# Patient Record
Sex: FEMALE | Race: WHITE | Employment: UNEMPLOYED | URBAN - METROPOLITAN AREA
[De-identification: names, ages, dates, MRNs, and addresses within clinical notes are randomized per-mention and may not be internally consistent; named-entity substitution may affect disease eponyms.]

---

## 2017-01-24 ENCOUNTER — HOSPITAL ENCOUNTER (OUTPATIENT)
Dept: GENERAL RADIOLOGY | Age: 42
Discharge: HOME | End: 2017-01-24
Attending: PHYSICIAN ASSISTANT
Payer: COMMERCIAL

## 2017-01-24 ENCOUNTER — OFFICE VISIT (OUTPATIENT)
Dept: FAMILY MEDICINE CLINIC | Facility: CLINIC | Age: 42
End: 2017-01-24

## 2017-01-24 ENCOUNTER — TELEPHONE (OUTPATIENT)
Dept: FAMILY MEDICINE CLINIC | Facility: CLINIC | Age: 42
End: 2017-01-24

## 2017-01-24 VITALS
TEMPERATURE: 98 F | BODY MASS INDEX: 46 KG/M2 | SYSTOLIC BLOOD PRESSURE: 110 MMHG | DIASTOLIC BLOOD PRESSURE: 60 MMHG | OXYGEN SATURATION: 98 % | HEART RATE: 107 BPM | RESPIRATION RATE: 18 BRPM | WEIGHT: 293 LBS

## 2017-01-24 DIAGNOSIS — J40 BRONCHITIS: Primary | ICD-10-CM

## 2017-01-24 DIAGNOSIS — J02.9 SORE THROAT: ICD-10-CM

## 2017-01-24 DIAGNOSIS — R05.9 COUGH: ICD-10-CM

## 2017-01-24 LAB — CONTROL LINE PRESENT WITH A CLEAR BACKGROUND (YES/NO): YES YES/NO

## 2017-01-24 PROCEDURE — 87880 STREP A ASSAY W/OPTIC: CPT | Performed by: PHYSICIAN ASSISTANT

## 2017-01-24 PROCEDURE — 99213 OFFICE O/P EST LOW 20 MIN: CPT | Performed by: PHYSICIAN ASSISTANT

## 2017-01-24 PROCEDURE — 94640 AIRWAY INHALATION TREATMENT: CPT | Performed by: PHYSICIAN ASSISTANT

## 2017-01-24 PROCEDURE — 71020 XR CHEST PA + LAT CHEST (CPT=71020): CPT

## 2017-01-24 RX ORDER — IPRATROPIUM BROMIDE AND ALBUTEROL SULFATE 2.5; .5 MG/3ML; MG/3ML
3 SOLUTION RESPIRATORY (INHALATION) ONCE
Status: COMPLETED | OUTPATIENT
Start: 2017-01-24 | End: 2017-01-24

## 2017-01-24 RX ORDER — HYDROCODONE POLISTIREX AND CHLORPHENIRAMINE POLISTIREX 10; 8 MG/5ML; MG/5ML
5 SUSPENSION, EXTENDED RELEASE ORAL 2 TIMES DAILY PRN
Qty: 115 ML | Refills: 0 | Status: SHIPPED | OUTPATIENT
Start: 2017-01-24 | End: 2017-02-03

## 2017-01-24 RX ORDER — PREDNISONE 20 MG/1
TABLET ORAL
Qty: 12 TABLET | Refills: 0 | Status: SHIPPED | OUTPATIENT
Start: 2017-01-24 | End: 2017-03-09 | Stop reason: ALTCHOICE

## 2017-01-24 RX ADMIN — IPRATROPIUM BROMIDE AND ALBUTEROL SULFATE 3 ML: 2.5; .5 SOLUTION RESPIRATORY (INHALATION) at 10:27:00

## 2017-01-24 NOTE — PROGRESS NOTES
CHIEF COMPLAINT:   Patient presents with:  Sore Throat: body aches, fever, sore throat, x 3days         HPI:   Debra Johnson is a 37year old female who presents for congestion for 3 days. Started as a sore throat.  She then began to have nasal congest Conjunctiva clear. No scleral icterus. HENT: Atraumatic, normocephalic. TM's pearly gray, no bulging, no fluid b/l. Nostrils patent, nasal mucosa pink and non-inflamed. No erythema of the throat. PND noted. No tonsillar enlargement or exudates   NECK: s

## 2017-02-16 RX ORDER — OMEPRAZOLE 40 MG/1
CAPSULE, DELAYED RELEASE ORAL
Qty: 90 CAPSULE | Refills: 0 | Status: SHIPPED | OUTPATIENT
Start: 2017-02-16 | End: 2017-05-05

## 2017-03-09 ENCOUNTER — OFFICE VISIT (OUTPATIENT)
Dept: FAMILY MEDICINE CLINIC | Facility: CLINIC | Age: 42
End: 2017-03-09

## 2017-03-09 VITALS
HEART RATE: 80 BPM | DIASTOLIC BLOOD PRESSURE: 60 MMHG | TEMPERATURE: 99 F | RESPIRATION RATE: 18 BRPM | WEIGHT: 293 LBS | BODY MASS INDEX: 46 KG/M2 | SYSTOLIC BLOOD PRESSURE: 100 MMHG | OXYGEN SATURATION: 99 %

## 2017-03-09 DIAGNOSIS — J40 BRONCHITIS: Primary | ICD-10-CM

## 2017-03-09 DIAGNOSIS — J04.0 LARYNGITIS: ICD-10-CM

## 2017-03-09 PROCEDURE — 99213 OFFICE O/P EST LOW 20 MIN: CPT | Performed by: NURSE PRACTITIONER

## 2017-03-09 RX ORDER — PREDNISONE 20 MG/1
20 TABLET ORAL 2 TIMES DAILY
Qty: 10 TABLET | Refills: 0 | Status: SHIPPED | OUTPATIENT
Start: 2017-03-09 | End: 2017-03-14

## 2017-03-10 NOTE — PATIENT INSTRUCTIONS
Humidifier in room  Sleep propped  Push fluids  Limit dairy  Mucinex as directed  Start steroids for worsening symptoms (wheezing, chest tightness, coughing fits)      Bronchitis, Viral (Adult)    You have a viral bronchitis.  Bronchitis is inflammation a · Your appetite may be poor, so a light diet is fine. Avoid dehydration by drinking 6 to 8 glasses of fluids per day (such as water, soft drinks, sports drinks, juices, tea, or soup). Extra fluids will help loosen secretions in the nose and lungs.   · Over- Laryngitis is a swelling of the tissues around the vocal cords. Symptoms include a hoarse (scratchy) voice. The voice may be lost completely. It may be caused by a viral illness, such as a head or chest cold.  It may also be due to overuse and strain of the

## 2017-03-10 NOTE — PROGRESS NOTES
CHIEF COMPLAINT:   Patient presents with:  Sore Throat: sore throat, loss of voice, cough, 3days      HPI:   David Nuñez is a 37year old female who presents for upper respiratory symptoms for  3 days.  Patient reports sore throat, congestion, dry co GI: denies N/V/C or abdominal pain  NEURO: Denies headaches    EXAM:   /60 mmHg  Pulse 80  Temp(Src) 99.2 °F (37.3 °C) (Oral)  Resp 18  Wt 303 lb  SpO2 99%  LMP 02/09/2017  GENERAL: well developed, well nourished,in no apparent distress  SKIN: no stefanie Start steroids for worsening symptoms (wheezing, chest tightness, coughing fits)      Bronchitis, Viral (Adult)    You have a viral bronchitis. Bronchitis is inflammation and swelling of the lining of the lungs. This is often caused by an infection.  Sympto · Your appetite may be poor, so a light diet is fine. Avoid dehydration by drinking 6 to 8 glasses of fluids per day (such as water, soft drinks, sports drinks, juices, tea, or soup). Extra fluids will help loosen secretions in the nose and lungs.   · Over- Laryngitis is a swelling of the tissues around the vocal cords. Symptoms include a hoarse (scratchy) voice. The voice may be lost completely. It may be caused by a viral illness, such as a head or chest cold.  It may also be due to overuse and strain of the

## 2017-03-12 ENCOUNTER — OFFICE VISIT (OUTPATIENT)
Dept: FAMILY MEDICINE CLINIC | Facility: CLINIC | Age: 42
End: 2017-03-12

## 2017-03-12 VITALS
WEIGHT: 293 LBS | OXYGEN SATURATION: 98 % | HEART RATE: 82 BPM | BODY MASS INDEX: 46 KG/M2 | DIASTOLIC BLOOD PRESSURE: 68 MMHG | SYSTOLIC BLOOD PRESSURE: 110 MMHG | TEMPERATURE: 98 F | RESPIRATION RATE: 16 BRPM

## 2017-03-12 DIAGNOSIS — Z02.9 ENCOUNTER FOR ADMINISTRATIVE EXAMINATIONS: Primary | ICD-10-CM

## 2017-03-12 NOTE — PROGRESS NOTES
Patient presents to walk in clinic with complaint of laryngitis and bronchitis. She was seen two days ago for URI and diagnosed with viral illness. She was given Prednisone to start but has not started it yet. Asking for an antibiotic for the bronchitis.

## 2017-03-13 ENCOUNTER — TELEPHONE (OUTPATIENT)
Dept: FAMILY MEDICINE CLINIC | Facility: CLINIC | Age: 42
End: 2017-03-13

## 2017-03-13 NOTE — TELEPHONE ENCOUNTER
Pt was seen in [facility] 3/9/17 & 3/12/17. Dr Jayme Hooper is listed as PCP but never saw her. You are the last family med provider to see her.  (used to be Dr Chelsea Sanchez pt). Please advise on if you require a OV, otc med, or will you send something?

## 2017-03-14 NOTE — TELEPHONE ENCOUNTER
Called and left detailed message on pt's vm of below. Ok per [employer]. Pt advised to call the office back to schedule an appointment.

## 2017-04-04 ENCOUNTER — HOSPITAL ENCOUNTER (EMERGENCY)
Age: 42
Discharge: HOME | End: 2017-04-04
Attending: EMERGENCY MEDICINE
Payer: COMMERCIAL

## 2017-04-04 ENCOUNTER — APPOINTMENT (OUTPATIENT)
Dept: CT IMAGING | Age: 42
End: 2017-04-04
Attending: EMERGENCY MEDICINE
Payer: COMMERCIAL

## 2017-04-04 ENCOUNTER — APPOINTMENT (OUTPATIENT)
Dept: GENERAL RADIOLOGY | Age: 42
End: 2017-04-04
Attending: EMERGENCY MEDICINE
Payer: COMMERCIAL

## 2017-04-04 ENCOUNTER — OFFICE VISIT (OUTPATIENT)
Dept: FAMILY MEDICINE CLINIC | Facility: CLINIC | Age: 42
End: 2017-04-04

## 2017-04-04 VITALS
TEMPERATURE: 99 F | RESPIRATION RATE: 17 BRPM | SYSTOLIC BLOOD PRESSURE: 118 MMHG | WEIGHT: 293 LBS | HEART RATE: 91 BPM | DIASTOLIC BLOOD PRESSURE: 59 MMHG | BODY MASS INDEX: 45.99 KG/M2 | HEIGHT: 67 IN | OXYGEN SATURATION: 98 %

## 2017-04-04 VITALS
OXYGEN SATURATION: 99 % | TEMPERATURE: 99 F | HEART RATE: 106 BPM | RESPIRATION RATE: 18 BRPM | BODY MASS INDEX: 45.99 KG/M2 | WEIGHT: 293 LBS | SYSTOLIC BLOOD PRESSURE: 118 MMHG | DIASTOLIC BLOOD PRESSURE: 82 MMHG | HEIGHT: 67 IN

## 2017-04-04 DIAGNOSIS — R06.02 SHORTNESS OF BREATH: Primary | ICD-10-CM

## 2017-04-04 DIAGNOSIS — J20.9 CHRONIC BRONCHITIS WITH ACUTE EXACERBATION (HCC): Primary | ICD-10-CM

## 2017-04-04 DIAGNOSIS — R60.0 PEDAL EDEMA: ICD-10-CM

## 2017-04-04 DIAGNOSIS — J42 CHRONIC BRONCHITIS WITH ACUTE EXACERBATION (HCC): Primary | ICD-10-CM

## 2017-04-04 PROCEDURE — 94640 AIRWAY INHALATION TREATMENT: CPT

## 2017-04-04 PROCEDURE — 71020 XR CHEST PA + LAT CHEST (CPT=71020): CPT

## 2017-04-04 PROCEDURE — 83880 ASSAY OF NATRIURETIC PEPTIDE: CPT | Performed by: EMERGENCY MEDICINE

## 2017-04-04 PROCEDURE — 85378 FIBRIN DEGRADE SEMIQUANT: CPT | Performed by: EMERGENCY MEDICINE

## 2017-04-04 PROCEDURE — 36415 COLL VENOUS BLD VENIPUNCTURE: CPT

## 2017-04-04 PROCEDURE — 80053 COMPREHEN METABOLIC PANEL: CPT | Performed by: EMERGENCY MEDICINE

## 2017-04-04 PROCEDURE — 81003 URINALYSIS AUTO W/O SCOPE: CPT | Performed by: EMERGENCY MEDICINE

## 2017-04-04 PROCEDURE — 71275 CT ANGIOGRAPHY CHEST: CPT

## 2017-04-04 PROCEDURE — 99214 OFFICE O/P EST MOD 30 MIN: CPT | Performed by: EMERGENCY MEDICINE

## 2017-04-04 PROCEDURE — 99285 EMERGENCY DEPT VISIT HI MDM: CPT

## 2017-04-04 PROCEDURE — 84484 ASSAY OF TROPONIN QUANT: CPT | Performed by: EMERGENCY MEDICINE

## 2017-04-04 PROCEDURE — 93005 ELECTROCARDIOGRAM TRACING: CPT

## 2017-04-04 PROCEDURE — 85610 PROTHROMBIN TIME: CPT | Performed by: EMERGENCY MEDICINE

## 2017-04-04 PROCEDURE — 99284 EMERGENCY DEPT VISIT MOD MDM: CPT

## 2017-04-04 PROCEDURE — 85025 COMPLETE CBC W/AUTO DIFF WBC: CPT | Performed by: EMERGENCY MEDICINE

## 2017-04-04 PROCEDURE — 93010 ELECTROCARDIOGRAM REPORT: CPT

## 2017-04-04 PROCEDURE — 85730 THROMBOPLASTIN TIME PARTIAL: CPT | Performed by: EMERGENCY MEDICINE

## 2017-04-04 RX ORDER — IPRATROPIUM BROMIDE AND ALBUTEROL SULFATE 2.5; .5 MG/3ML; MG/3ML
3 SOLUTION RESPIRATORY (INHALATION) ONCE
Status: COMPLETED | OUTPATIENT
Start: 2017-04-04 | End: 2017-04-04

## 2017-04-04 RX ORDER — AZITHROMYCIN 250 MG/1
TABLET, FILM COATED ORAL
Qty: 1 PACKAGE | Refills: 0 | Status: SHIPPED | OUTPATIENT
Start: 2017-04-04 | End: 2017-04-09

## 2017-04-04 RX ORDER — ALBUTEROL SULFATE 2.5 MG/3ML
2.5 SOLUTION RESPIRATORY (INHALATION) EVERY 4 HOURS PRN
Qty: 30 AMPULE | Refills: 0 | Status: SHIPPED | OUTPATIENT
Start: 2017-04-04 | End: 2017-05-04

## 2017-04-04 NOTE — PATIENT INSTRUCTIONS
Proceed to the ER for further evaluation of SOB and leg swelling          Acute Bronchitis  Your healthcare provider has told you that you have acute bronchitis. Bronchitis is infection or inflammation of the bronchial tubes (airways in the lungs).  Normall plenty of fluids, such as water, juice, or warm soup. Fluids loosen mucus so that you can cough it up. This helps you breathe more easily. Fluids also prevent dehydration. · Make sure you get plenty of rest.  · Do not smoke.  Do not allow anyone else to sm

## 2017-04-04 NOTE — ED PROVIDER NOTES
Patient Seen in: [address] In [place]    History   Patient presents with:  Cough/URI    Stated Complaint: COUGH    HPI    Patient presents with shortness of breath.   The patient states that 3 weeks ago she had bronchitis and laryngit Capsule Delayed Release,  TAKE 1 CAPSULE BY MOUTH EVERY DAY   Albuterol Sulfate HFA (PROAIR HFA) 108 (90 BASE) MCG/ACT Inhalation Aero Soln,  Inhale 2 puffs into the lungs every 4 (four) hours as needed for Wheezing or Shortness of Breath.    LORazepam 0.5 inspiration. Abdomen: Soft, nontender, no rebound or guarding, normal active bowel sounds, no CVA tenderness. Extremities: Mild nonpitting edema to lower extremities bilaterally, pedal pulses intact. Skin: Warm and dry.   No rash, some residual hyperpigm heparin anti-Xa units/mL. PRO BETA NATRIURETIC PEPTIDE - Normal   CBC WITH DIFFERENTIAL WITH PLATELET    Narrative: The following orders were created for panel order CBC WITH DIFFERENTIAL WITH PLATELET.   Procedure                               Abno 3 mL (3 mL Nebulization Given 4/4/17 1543)   ipratropium-albuterol (DUONEB) nebulizer solution 3 mL (3 mL Nebulization Given 4/4/17 1652)   iohexol (OMNIPAQUE) 350 MG/ML injection 82 mL (82 mL Intravenous Given 4/4/17 1635)     The patient was given a tota

## 2017-04-04 NOTE — PROGRESS NOTES
Chief Complaint:   Patient presents with:  Cough: Started 1 month ago. Pt c/o lingering SOB, ear pain, throat pain, chest congestion, and productive cough. Pt c/o severe edema of legs and hands.      HPI:   This is a 37year old female   C/O 3 week history Rfl: 1   BuPROPion HCl ER, SR, (WELLBUTRIN SR) 150 MG Oral Tablet 12 Hr  Disp:  Rfl: 1   Lithium Carbonate ER (LITHOBID) 300 MG Oral Tab CR Take 900 mg by mouth nightly.    Disp: 90 tablet Rfl: 0     No current facility-administered medications on file prio

## 2017-04-10 ENCOUNTER — OFFICE VISIT (OUTPATIENT)
Dept: FAMILY MEDICINE CLINIC | Facility: CLINIC | Age: 42
End: 2017-04-10

## 2017-04-10 VITALS
DIASTOLIC BLOOD PRESSURE: 88 MMHG | TEMPERATURE: 99 F | WEIGHT: 293 LBS | SYSTOLIC BLOOD PRESSURE: 124 MMHG | OXYGEN SATURATION: 99 % | HEART RATE: 90 BPM | BODY MASS INDEX: 48 KG/M2 | RESPIRATION RATE: 18 BRPM

## 2017-04-10 DIAGNOSIS — J20.9 BRONCHOSPASM WITH BRONCHITIS, ACUTE: Primary | ICD-10-CM

## 2017-04-10 DIAGNOSIS — R06.00 DYSPNEA ON EXERTION: ICD-10-CM

## 2017-04-10 PROCEDURE — 99214 OFFICE O/P EST MOD 30 MIN: CPT | Performed by: EMERGENCY MEDICINE

## 2017-04-10 RX ORDER — PREDNISONE 20 MG/1
60 TABLET ORAL DAILY
Qty: 15 TABLET | Refills: 0 | Status: ON HOLD | OUTPATIENT
Start: 2017-04-10 | End: 2017-04-16

## 2017-04-10 RX ORDER — IPRATROPIUM BROMIDE AND ALBUTEROL SULFATE 2.5; .5 MG/3ML; MG/3ML
3 SOLUTION RESPIRATORY (INHALATION) EVERY 6 HOURS PRN
Qty: 30 CONTAINER | Refills: 1 | Status: ON HOLD | OUTPATIENT
Start: 2017-04-10 | End: 2017-04-24

## 2017-04-10 NOTE — PROGRESS NOTES
Chief Complaint:   Patient presents with:  Cough: Pt here for continued cough and SOB. Pt requesting to be tested for mono. Referral: Pt would like referral to pulmonology.      HPI:   This is a 37year old female   Here for follow up of cough  Still with QAM PRN Disp:  Rfl: 1   BuPROPion HCl ER, SR, (WELLBUTRIN SR) 150 MG Oral Tablet 12 Hr  Disp:  Rfl: 1   Lithium Carbonate ER (LITHOBID) 300 MG Oral Tab CR Take 900 mg by mouth nightly.    Disp: 90 tablet Rfl: 0     No current facility-administered medicati Solution; Take 3 mL by nebulization every 6 (six) hours as needed. -     CARD ECHO 2D DOPPLER (CPT=93306); Future     plan: Patient continues to have diffuse wheezing and may be the cause of near syncopal episode.   All diagnostic workup and laboratories d

## 2017-04-10 NOTE — PATIENT INSTRUCTIONS
Thank you for choosing [facility]  To Do:  [address]  1. Follow up in 1 week  2. Take steroids as directed  3. Arrange for 2D echo  4.  Continue with nebulizations        Bronchospasm (Adult)    Bronchospasm occurs when the airways (b or older, have a chronic lung disease or condition that affects your immune system, or you smoke, we recommend getting pneumococcal vaccinations, as well as an influenza vaccination (flu shot) every autumn. Ask your healthcare provider about this.   When to

## 2017-04-12 ENCOUNTER — TELEPHONE (OUTPATIENT)
Dept: FAMILY MEDICINE CLINIC | Facility: CLINIC | Age: 42
End: 2017-04-12

## 2017-04-12 DIAGNOSIS — R05.9 COUGH: ICD-10-CM

## 2017-04-12 DIAGNOSIS — J42 CHRONIC BRONCHITIS, UNSPECIFIED CHRONIC BRONCHITIS TYPE (HCC): Primary | ICD-10-CM

## 2017-04-12 NOTE — TELEPHONE ENCOUNTER
Yes, ok to refer to Pullmonology, Dr. Lashae Gomez      Close follow up with Pulmonology  Dr. Robby Hays [facility]    [address] [facility] [address]  [place]

## 2017-04-12 NOTE — TELEPHONE ENCOUNTER
Pt is not feeling any better since LOV 4/10 with her cough & chronic bronchitis. Pt has not done Echo. Pt is requesting Pulmonology referral.  Teddy Gupta for Pulmonology consult?

## 2017-04-13 ENCOUNTER — OFFICE VISIT (OUTPATIENT)
Dept: FAMILY MEDICINE CLINIC | Facility: CLINIC | Age: 42
End: 2017-04-13

## 2017-04-13 ENCOUNTER — APPOINTMENT (OUTPATIENT)
Dept: GENERAL RADIOLOGY | Facility: HOSPITAL | Age: 42
DRG: 202 | End: 2017-04-13
Attending: HOSPITALIST
Payer: COMMERCIAL

## 2017-04-13 ENCOUNTER — TELEPHONE (OUTPATIENT)
Dept: FAMILY MEDICINE CLINIC | Facility: CLINIC | Age: 42
End: 2017-04-13

## 2017-04-13 ENCOUNTER — HOSPITAL ENCOUNTER (INPATIENT)
Facility: HOSPITAL | Age: 42
LOS: 3 days | Discharge: HOME | DRG: 202 | End: 2017-04-16
Attending: HOSPITALIST | Admitting: HOSPITALIST
Payer: COMMERCIAL

## 2017-04-13 VITALS
DIASTOLIC BLOOD PRESSURE: 88 MMHG | OXYGEN SATURATION: 99 % | TEMPERATURE: 98 F | RESPIRATION RATE: 18 BRPM | HEART RATE: 86 BPM | SYSTOLIC BLOOD PRESSURE: 124 MMHG

## 2017-04-13 DIAGNOSIS — J98.01 BRONCHOSPASM: Primary | ICD-10-CM

## 2017-04-13 DIAGNOSIS — J45.42 MODERATE PERSISTENT ASTHMA WITH STATUS ASTHMATICUS: Primary | ICD-10-CM

## 2017-04-13 PROCEDURE — 99223 1ST HOSP IP/OBS HIGH 75: CPT | Performed by: HOSPITALIST

## 2017-04-13 PROCEDURE — 99215 OFFICE O/P EST HI 40 MIN: CPT | Performed by: EMERGENCY MEDICINE

## 2017-04-13 PROCEDURE — 71020 XR CHEST PA + LAT CHEST (CPT=71020): CPT

## 2017-04-13 RX ORDER — BUPROPION HYDROCHLORIDE 150 MG/1
150 TABLET, EXTENDED RELEASE ORAL 2 TIMES DAILY
Status: DISCONTINUED | OUTPATIENT
Start: 2017-04-13 | End: 2017-04-14

## 2017-04-13 RX ORDER — LITHIUM CARBONATE 300 MG/1
600 TABLET, FILM COATED, EXTENDED RELEASE ORAL NIGHTLY
Status: DISCONTINUED | OUTPATIENT
Start: 2017-04-13 | End: 2017-04-16

## 2017-04-13 RX ORDER — ENOXAPARIN SODIUM 100 MG/ML
40 INJECTION SUBCUTANEOUS NIGHTLY
Status: DISCONTINUED | OUTPATIENT
Start: 2017-04-13 | End: 2017-04-14

## 2017-04-13 RX ORDER — ONDANSETRON 2 MG/ML
4 INJECTION INTRAMUSCULAR; INTRAVENOUS EVERY 6 HOURS PRN
Status: DISCONTINUED | OUTPATIENT
Start: 2017-04-13 | End: 2017-04-16

## 2017-04-13 RX ORDER — LORAZEPAM 0.5 MG/1
0.5 TABLET ORAL DAILY PRN
Status: DISCONTINUED | OUTPATIENT
Start: 2017-04-13 | End: 2017-04-16

## 2017-04-13 RX ORDER — PANTOPRAZOLE SODIUM 40 MG/1
40 TABLET, DELAYED RELEASE ORAL
Status: DISCONTINUED | OUTPATIENT
Start: 2017-04-14 | End: 2017-04-15

## 2017-04-13 RX ORDER — ACETAMINOPHEN 325 MG/1
650 TABLET ORAL EVERY 6 HOURS PRN
Status: DISCONTINUED | OUTPATIENT
Start: 2017-04-13 | End: 2017-04-16

## 2017-04-13 RX ORDER — METHYLPREDNISOLONE SODIUM SUCCINATE 40 MG/ML
40 INJECTION, POWDER, LYOPHILIZED, FOR SOLUTION INTRAMUSCULAR; INTRAVENOUS EVERY 6 HOURS
Status: DISCONTINUED | OUTPATIENT
Start: 2017-04-14 | End: 2017-04-14

## 2017-04-13 RX ORDER — IPRATROPIUM BROMIDE AND ALBUTEROL SULFATE 2.5; .5 MG/3ML; MG/3ML
3 SOLUTION RESPIRATORY (INHALATION)
Status: DISCONTINUED | OUTPATIENT
Start: 2017-04-13 | End: 2017-04-15

## 2017-04-13 NOTE — PATIENT INSTRUCTIONS
Proceed to the [facility] to be directly admitted            Asthma (Adult)  Asthma is a disease where the medium and  small air passages within the lung go into spasm and restrict the flow of air.  Inflammation and swelling of the airways cause further re developing a personalized \"Asthma Action Plan. \"  A pneumococcal (pneumonia) vaccine and yearly flu shot (every fall) are recommended. Ask your doctor about this.   When to seek medical advice  Call your healthcare provider right away if any of these occur

## 2017-04-13 NOTE — TELEPHONE ENCOUNTER
Pt states she is on her 4th day of Prednisone & is not any better, she is concerned to go the whole weekend feeling the way she feels still , no improvement since LOV 4/10, still SOB, coughing, she gets SOB when trying to speak.   Appt made for pt to see

## 2017-04-13 NOTE — TELEPHONE ENCOUNTER
Detailed Message left on pt's VM per [employer] with below information & to proceed to get Echocardiogram done. Referral placed in EPIC. Call office back with any questions.

## 2017-04-13 NOTE — PROGRESS NOTES
Chief Complaint:   Patient presents with: Follow - Up: PT still not feeling better. HPI:   This is a 37year old female   C/O continued SOB. Mild pressure to chest with cough and SOB.   Seen here 4 days ago  Rested for 2 days with neb treatments which Breath. Disp: 1 Inhaler Rfl: 6   LORazepam 0.5 MG Oral Tab TK 1 T PO  QAM PRN Disp:  Rfl: 1   BuPROPion HCl ER, SR, (WELLBUTRIN SR) 150 MG Oral Tablet 12 Hr  Disp:  Rfl: 1   Lithium Carbonate ER (LITHOBID) 300 MG Oral Tab CR Take 900 mg by mouth nightly. discussed with on call hospitalis Dr. Jocelyne Gillis    No Follow-up on file.

## 2017-04-14 PROCEDURE — 99232 SBSQ HOSP IP/OBS MODERATE 35: CPT | Performed by: INTERNAL MEDICINE

## 2017-04-14 RX ORDER — BUPROPION HYDROCHLORIDE 150 MG/1
150 TABLET, EXTENDED RELEASE ORAL DAILY
Status: DISCONTINUED | OUTPATIENT
Start: 2017-04-14 | End: 2017-04-16

## 2017-04-14 RX ORDER — PREDNISONE 20 MG/1
40 TABLET ORAL EVERY EVENING
Status: DISCONTINUED | OUTPATIENT
Start: 2017-04-14 | End: 2017-04-16

## 2017-04-14 RX ORDER — CODEINE PHOSPHATE AND GUAIFENESIN 10; 100 MG/5ML; MG/5ML
5 SOLUTION ORAL EVERY 4 HOURS PRN
Status: DISCONTINUED | OUTPATIENT
Start: 2017-04-14 | End: 2017-04-16

## 2017-04-14 RX ORDER — ENOXAPARIN SODIUM 100 MG/ML
0.5 INJECTION SUBCUTANEOUS NIGHTLY
Status: DISCONTINUED | OUTPATIENT
Start: 2017-04-14 | End: 2017-04-16

## 2017-04-14 RX ORDER — LITHIUM CARBONATE 300 MG/1
300 TABLET, FILM COATED, EXTENDED RELEASE ORAL DAILY
Status: DISCONTINUED | OUTPATIENT
Start: 2017-04-14 | End: 2017-04-16

## 2017-04-14 NOTE — PROGRESS NOTES
[facility] Progress Note:  Anticoagulation Weight Dose Adjustment for enoxaparin (LOVENOX)    Celia Lopez is a 37year old female who has been prescribed enoxaparin (LOVENOX) for VTE prophylaxis.       CrCl cannot be calculated (Patient has no seru

## 2017-04-14 NOTE — PLAN OF CARE
PAIN - ADULT    • Verbalizes/displays adequate comfort level or patient's stated pain goal Progressing        Patient/Family Goals    • Patient/Family Short Term Goal Progressing        SAFETY ADULT - FALL    • Free from fall injury Progressing          GA

## 2017-04-14 NOTE — PROGRESS NOTES
[facility]  Progress Note     Owen Pleasure Patient Status:  Inpatient    1975 MRN [id number]   [facility] 5NW-A Attending Mayra Caceres MD   Hosp Day # 1 PCP Ezekiel Javed MD     Chief Complaint: cough    S: Cher Maddox AST, ALT, BILT, TP in the last 72 hours. No results for input(s): PTP, INR in the last 72 hours. No results for input(s): TROP, CK in the last 72 hours. Imaging: Imaging data reviewed in Epic.     Medications:   • BuPROPion HCl ER (SR)  150 mg

## 2017-04-14 NOTE — CM/SW NOTE
04/14/17 1600   CM/SW Screening   Referral [phone] staff; Chart review;Nursing rounds   Patient's Mental Status Alert;Oriented   Patient lives with Spouse   Patient Status Prior to Admission   Independent with ADLs an

## 2017-04-14 NOTE — H&P
[facility]  History and Physical     Read Rosio Patient Status:  Inpatient    1975 MRN [id number]   [facility] 5NW-A Attending Chico Schmid, [facility] [place] Day # 0 PCP Amanda Block MD     Chief Complaint: Cough, SOB ipratropium-albuterol (DUONEB) 0.5-2.5 (3) MG/3ML Inhalation Solution Take 3 mL by nebulization every 6 (six) hours as needed.  Disp: 30 Container Rfl: 1   albuterol sulfate (2.5 MG/3ML) 0.083% Inhalation Nebu Soln Take 3 mL (2.5 mg total) by nebulization for input(s): WBC, HGB, MCV, PLT, BAND, INR in the last 72 hours. Invalid input(s): LYM#, MONO#, BASOS#, EOSIN#    No results for input(s): GLU, BUN, CREATSERUM, GFRAA, GFRNAA, CA, ALB, NA, K, CL, CO2, ALKPHO, AST, ALT, BILT, TP in the last 72 hours.

## 2017-04-14 NOTE — PAYOR COMM NOTE
Attending Physician: Kenneth Peralta MD    Review Type: ADMISSION   Reviewer: Poly Javier       Date: April 14, 2017 - 8:14 AM  Payor: NESTOR MILLIGAN  Authorization Number: [id number]/A  Admit date: 4/13/2017  7:17 PM   Admitted from Emergency Dept.:   No     REVI

## 2017-04-15 ENCOUNTER — APPOINTMENT (OUTPATIENT)
Dept: CT IMAGING | Facility: HOSPITAL | Age: 42
DRG: 202 | End: 2017-04-15
Attending: INTERNAL MEDICINE
Payer: COMMERCIAL

## 2017-04-15 ENCOUNTER — APPOINTMENT (OUTPATIENT)
Dept: CV DIAGNOSTICS | Facility: HOSPITAL | Age: 42
DRG: 202 | End: 2017-04-15
Attending: INTERNAL MEDICINE
Payer: COMMERCIAL

## 2017-04-15 PROCEDURE — 93306 TTE W/DOPPLER COMPLETE: CPT

## 2017-04-15 PROCEDURE — 71250 CT THORAX DX C-: CPT

## 2017-04-15 PROCEDURE — 99232 SBSQ HOSP IP/OBS MODERATE 35: CPT | Performed by: INTERNAL MEDICINE

## 2017-04-15 PROCEDURE — 93306 TTE W/DOPPLER COMPLETE: CPT | Performed by: INTERNAL MEDICINE

## 2017-04-15 RX ORDER — IPRATROPIUM BROMIDE AND ALBUTEROL SULFATE 2.5; .5 MG/3ML; MG/3ML
3 SOLUTION RESPIRATORY (INHALATION)
Status: DISCONTINUED | OUTPATIENT
Start: 2017-04-15 | End: 2017-04-15

## 2017-04-15 RX ORDER — PANTOPRAZOLE SODIUM 40 MG/1
40 TABLET, DELAYED RELEASE ORAL
Status: DISCONTINUED | OUTPATIENT
Start: 2017-04-15 | End: 2017-04-16

## 2017-04-15 RX ORDER — IPRATROPIUM BROMIDE AND ALBUTEROL SULFATE 2.5; .5 MG/3ML; MG/3ML
3 SOLUTION RESPIRATORY (INHALATION)
Status: DISCONTINUED | OUTPATIENT
Start: 2017-04-15 | End: 2017-04-16

## 2017-04-15 RX ORDER — FLUTICASONE PROPIONATE 50 MCG
1 SPRAY, SUSPENSION (ML) NASAL DAILY
Status: DISCONTINUED | OUTPATIENT
Start: 2017-04-15 | End: 2017-04-16

## 2017-04-15 NOTE — PROGRESS NOTES
[facility]  Progress Note     [facility] Patient Status:  Inpatient    1975 MRN [id number]   [facility] 5NW-A Attending Mis Yousif MD   Hosp Day # 2 PCP Lawrence Be MD     Chief Complaint: cough    S: Farzad Romero Oral QAM AC       ASSESSMENT / PLAN:     1. Chronic cough with associated bronchospasm  1. CXR negative, CTA negative for PE, pneumonia. Resp viral panel negative. Unlikely that patient has bronchitis.   2. Also considering bronchospasm/cough secondary to G

## 2017-04-16 VITALS
DIASTOLIC BLOOD PRESSURE: 74 MMHG | TEMPERATURE: 98 F | SYSTOLIC BLOOD PRESSURE: 122 MMHG | HEIGHT: 68 IN | BODY MASS INDEX: 44.41 KG/M2 | HEART RATE: 72 BPM | WEIGHT: 293 LBS | RESPIRATION RATE: 18 BRPM | OXYGEN SATURATION: 98 %

## 2017-04-16 PROCEDURE — 99239 HOSP IP/OBS DSCHRG MGMT >30: CPT | Performed by: INTERNAL MEDICINE

## 2017-04-16 RX ORDER — PREDNISONE 20 MG/1
40 TABLET ORAL EVERY EVENING
Qty: 6 TABLET | Refills: 0 | Status: SHIPPED | OUTPATIENT
Start: 2017-04-16 | End: 2017-04-19

## 2017-04-16 RX ORDER — CODEINE PHOSPHATE AND GUAIFENESIN 10; 100 MG/5ML; MG/5ML
5 SOLUTION ORAL EVERY 4 HOURS PRN
Qty: 180 ML | Refills: 1 | Status: SHIPPED | OUTPATIENT
Start: 2017-04-16 | End: 2017-04-26

## 2017-04-16 NOTE — PLAN OF CARE
Patient/Family Goals    • Patient/Family Long Term Goal Progressing    • Patient/Family Short Term Goal Progressing        RESPIRATORY - ADULT    • Achieves optimal ventilation and oxygenation Progressing        Problem: asthma exacerbation  Data: Patient

## 2017-04-16 NOTE — PROGRESS NOTES
NURSING DISCHARGE NOTE    Discharged Home via Wheelchair. Accompanied by Support staff  Belongings Taken by patient/family. VSS. Iv discontinued. Discharge instructions and prescriptions were given to patient. She verbalized understanding.

## 2017-04-17 ENCOUNTER — OFFICE VISIT (OUTPATIENT)
Dept: FAMILY MEDICINE CLINIC | Facility: CLINIC | Age: 42
End: 2017-04-17

## 2017-04-17 VITALS
HEIGHT: 67 IN | WEIGHT: 293 LBS | RESPIRATION RATE: 18 BRPM | SYSTOLIC BLOOD PRESSURE: 118 MMHG | DIASTOLIC BLOOD PRESSURE: 68 MMHG | OXYGEN SATURATION: 98 % | HEART RATE: 100 BPM | BODY MASS INDEX: 45.99 KG/M2 | TEMPERATURE: 99 F

## 2017-04-17 DIAGNOSIS — J20.9 BRONCHOSPASM WITH BRONCHITIS, ACUTE: Primary | ICD-10-CM

## 2017-04-17 PROCEDURE — 99213 OFFICE O/P EST LOW 20 MIN: CPT | Performed by: EMERGENCY MEDICINE

## 2017-04-17 NOTE — PROCEDURES
This test includes spirometry and flow volume loop done at the bedside during an inpatient hospitalization. The technician reports that the patient was not able to reliably reproduce these results. ATS reproducibility criteria were not met.   This lesse

## 2017-04-17 NOTE — PATIENT INSTRUCTIONS
Thank you for choosing [facility]  To Do:  [address]  1. Follow up with pulmonology, Dr. Camille Chun  2. Continue with prednisone  3. Continue with albuterol nebs as needed  4.  Continue with steroid inhaler     Close follow up with Pulmon or fever-reducing medicines like ibuprofen and naproxen. Talk to your healthcare provider if you think this may apply to you. Follow-up care  Follow up with your healthcare provider, or as advised.  Always bring all of your current medicines to any appoint

## 2017-04-17 NOTE — DISCHARGE SUMMARY
[facility]  DISCHARGE SUMMARY     Zuleyka Rojas Patient Status:  Inpatient    1975 MRN [id number]   [facility] 5NW-A Attending No att. providers found   Hosp Day # 3 PCP Kvng Acuna MD     Date of Admission: 20 several recent bouts of bronchitis associated with sinusitis as well as recent dyspnea, fatigue and a raised, erythematous, pruritic lower extremity rash. She said that she had never had issues with bronchitis/coughing until recently.  A workup was done for by mouth every 4 (four) hours as needed for cough.     Stop taking on:  4/26/2017   Quantity:  180 mL   Refills:  1         CHANGE how you take these medications       Instructions Prescription details    predniSONE 20 MG Tabs   Last time this was given:  [address] AT [facility], [phone], [address] [place] [id number]     Phone:  [phone]    - Fluticasone Propionate  MCG/ACT Aero  - predniSONE 20 MG Tabs      Please  your prescriptions a

## 2017-04-17 NOTE — PROGRESS NOTES
Chief Complaint:   Patient presents with: Follow - Up: F/U hospital stay. Pt still 'winded' w/activity, cough, fatigue, and SOB. Pt concered w/ reoccuring symptoms.      HPI:   This is a 37year old female   Here for follow up of hospital admission Oral Capsule Delayed Release TAKE 1 CAPSULE BY MOUTH EVERY DAY Disp: 90 capsule Rfl: 0   Albuterol Sulfate HFA (PROAIR HFA) 108 (90 BASE) MCG/ACT Inhalation Aero Soln Inhale 2 puffs into the lungs every 4 (four) hours as needed for Wheezing or Shortness of visit.    Counseling given: Not Answered      REVIEW OF SYSTEMS:   Review of systems significant for cough shortness of breath    The rest of the ROS is negative except for those stated as above    PHYSICAL EXAM:   /68 mmHg  Pulse 100  Temp(Src) 98.9 further evaluation and treatment.

## 2017-04-20 ENCOUNTER — RT VISIT (OUTPATIENT)
Dept: RESPIRATORY THERAPY | Facility: HOSPITAL | Age: 42
End: 2017-04-20
Attending: INTERNAL MEDICINE
Payer: COMMERCIAL

## 2017-04-20 DIAGNOSIS — J98.01 BRONCHOSPASM: ICD-10-CM

## 2017-04-20 PROCEDURE — 94729 DIFFUSING CAPACITY: CPT

## 2017-04-20 PROCEDURE — 94070 EVALUATION OF WHEEZING: CPT

## 2017-04-20 PROCEDURE — 94726 PLETHYSMOGRAPHY LUNG VOLUMES: CPT

## 2017-04-22 NOTE — PROCEDURES
Brown Copeland is a 60-year-old  American female who stands 5 feet 7 inches tall and weighs 302 pounds. She underwent methacholine challenge testing on 4/20/17. She carries a diagnosis of bronchospasm.   She has a 4-pack-year smoking history but

## 2017-04-24 ENCOUNTER — TELEPHONE (OUTPATIENT)
Dept: FAMILY MEDICINE CLINIC | Facility: CLINIC | Age: 42
End: 2017-04-24

## 2017-04-24 RX ORDER — IPRATROPIUM BROMIDE AND ALBUTEROL SULFATE 2.5; .5 MG/3ML; MG/3ML
SOLUTION RESPIRATORY (INHALATION)
Qty: 90 ML | Refills: 1 | Status: SHIPPED | OUTPATIENT
Start: 2017-04-24 | End: 2017-05-05

## 2017-04-24 NOTE — TELEPHONE ENCOUNTER
Need to route to [facility] after pulm visit on 17. I called pt at [phone] and verified 2 patient identifiers: name & . I discussed results with patient.   Pt is frustrated/diappointed that all results all normal.  She wants to have a r

## 2017-04-24 NOTE — TELEPHONE ENCOUNTER
Notes Recorded by Ho Haque MD on 4/24/2017 at 10:34 AM  All allergy testing for mold was negative  Notes Recorded by Ho Haque MD on 4/21/2017 at 12:49 PM  Allergy panel so far negative  Still await Aspergillus IGG AB

## 2017-05-04 ENCOUNTER — APPOINTMENT (OUTPATIENT)
Dept: ULTRASOUND IMAGING | Age: 42
End: 2017-05-04
Attending: EMERGENCY MEDICINE
Payer: COMMERCIAL

## 2017-05-04 ENCOUNTER — HOSPITAL ENCOUNTER (EMERGENCY)
Age: 42
Discharge: HOME | End: 2017-05-04
Attending: EMERGENCY MEDICINE
Payer: COMMERCIAL

## 2017-05-04 VITALS
SYSTOLIC BLOOD PRESSURE: 115 MMHG | RESPIRATION RATE: 18 BRPM | OXYGEN SATURATION: 95 % | HEART RATE: 80 BPM | TEMPERATURE: 98 F | WEIGHT: 293 LBS | BODY MASS INDEX: 44.41 KG/M2 | DIASTOLIC BLOOD PRESSURE: 82 MMHG | HEIGHT: 68 IN

## 2017-05-04 DIAGNOSIS — R60.9 DEPENDENT EDEMA: Primary | ICD-10-CM

## 2017-05-04 PROCEDURE — 99284 EMERGENCY DEPT VISIT MOD MDM: CPT

## 2017-05-04 PROCEDURE — 93970 EXTREMITY STUDY: CPT | Performed by: EMERGENCY MEDICINE

## 2017-05-04 RX ORDER — POTASSIUM CHLORIDE 1500 MG/1
1 TABLET, FILM COATED, EXTENDED RELEASE ORAL 2 TIMES DAILY
Qty: 10 TABLET | Refills: 0 | Status: SHIPPED | OUTPATIENT
Start: 2017-05-04 | End: 2017-05-09

## 2017-05-04 RX ORDER — FUROSEMIDE 20 MG/1
20 TABLET ORAL DAILY
Qty: 5 TABLET | Refills: 0 | Status: SHIPPED | OUTPATIENT
Start: 2017-05-04 | End: 2017-05-09

## 2017-05-05 RX ORDER — OMEPRAZOLE 40 MG/1
CAPSULE, DELAYED RELEASE ORAL
Qty: 90 CAPSULE | Refills: 0 | Status: SHIPPED | OUTPATIENT
Start: 2017-05-05 | End: 2017-08-14

## 2017-05-05 RX ORDER — IPRATROPIUM BROMIDE AND ALBUTEROL SULFATE 2.5; .5 MG/3ML; MG/3ML
SOLUTION RESPIRATORY (INHALATION)
Qty: 90 ML | Refills: 0 | Status: SHIPPED | OUTPATIENT
Start: 2017-05-05 | End: 2017-05-16 | Stop reason: ALTCHOICE

## 2017-05-05 NOTE — ED INITIAL ASSESSMENT (HPI)
Pt c/o bilateral feet/ankle swelling, left worse than right, x 3 days. Pt denies SOB, chest pain. Pt denies recent long travel. States recent hospitalization for \"bronchitis\".

## 2017-05-05 NOTE — ED PROVIDER NOTES
Patient Seen in: [facility] In [place]    History   Patient presents with:  Swelling Edema (cardiovascular, metabolic)    Stated Complaint: swelling to bilateral ankles, feet L > R x3 days     HPI    52-year-old female presents to the EVERY 6 HOURS AS NEEDED       Family History   Problem Relation Age of Onset   • Breast Cancer Maternal Cousin Female 36     early 42's    • Breast Cancer Maternal Cousin Female 36     early 42's    • Depression Mother    • Emphysema [Other] Jatin Sanchez Father to display  Ultrasound of both lower extremities was obtained and revealed no evidence of DVT. Exam findings and treatment plan including empiric treatment with short-term diuretic with potassium replacement was discussed prior to disposition.   MDM

## 2017-05-15 ENCOUNTER — TELEPHONE (OUTPATIENT)
Dept: FAMILY MEDICINE CLINIC | Facility: CLINIC | Age: 42
End: 2017-05-15

## 2017-05-15 NOTE — TELEPHONE ENCOUNTER
Pt states she went to the ER for Tevin leg swelling. Was given water pill and k+. Pt states it didn't help and swelling is worse-\"really bad\".   Pt states she can press her thumb into the top of the foot and it will stay indented and white for over a eber

## 2017-05-16 ENCOUNTER — OFFICE VISIT (OUTPATIENT)
Dept: FAMILY MEDICINE CLINIC | Facility: CLINIC | Age: 42
End: 2017-05-16

## 2017-05-16 VITALS
RESPIRATION RATE: 16 BRPM | BODY MASS INDEX: 45.99 KG/M2 | HEART RATE: 88 BPM | TEMPERATURE: 99 F | DIASTOLIC BLOOD PRESSURE: 82 MMHG | WEIGHT: 293 LBS | SYSTOLIC BLOOD PRESSURE: 110 MMHG | HEIGHT: 67 IN | OXYGEN SATURATION: 97 %

## 2017-05-16 DIAGNOSIS — Z13.21 SCREENING FOR ENDOCRINE, NUTRITIONAL, METABOLIC AND IMMUNITY DISORDER: ICD-10-CM

## 2017-05-16 DIAGNOSIS — Z13.29 SCREENING FOR ENDOCRINE, NUTRITIONAL, METABOLIC AND IMMUNITY DISORDER: ICD-10-CM

## 2017-05-16 DIAGNOSIS — Z12.39 BREAST CANCER SCREENING: ICD-10-CM

## 2017-05-16 DIAGNOSIS — R60.0 PEDAL EDEMA: Primary | ICD-10-CM

## 2017-05-16 DIAGNOSIS — Z13.228 SCREENING FOR ENDOCRINE, NUTRITIONAL, METABOLIC AND IMMUNITY DISORDER: ICD-10-CM

## 2017-05-16 DIAGNOSIS — Z13.0 SCREENING FOR ENDOCRINE, NUTRITIONAL, METABOLIC AND IMMUNITY DISORDER: ICD-10-CM

## 2017-05-16 PROCEDURE — 99214 OFFICE O/P EST MOD 30 MIN: CPT | Performed by: EMERGENCY MEDICINE

## 2017-05-16 RX ORDER — SPIRONOLACTONE 25 MG/1
25 TABLET ORAL DAILY
Qty: 7 TABLET | Refills: 1 | Status: SHIPPED | OUTPATIENT
Start: 2017-05-16 | End: 2017-06-07

## 2017-05-16 NOTE — PATIENT INSTRUCTIONS
Thank you for choosing [facility]  To Do:  [address]  1. Follow up in 2 weeks for well woman exam  2. Have blood tests done after fasting  3. Arrange for mammogram  4.  Take Spironolactone as directed

## 2017-05-16 NOTE — PROGRESS NOTES
Chief Complaint:   Patient presents with:  Edema: Bilateral leg/foot edema x2 weeks. HPI:   This is a 37year old female presents with bilateral lower leg swelling. Complains of diffuse swelling and discomfort to both legs. Feels sore.   Minimally rel SYSTEMS:   Review of systems significant for pedal edema.     The rest of the ROS is negative except for those stated as above    PHYSICAL EXAM:   /82 mmHg  Pulse 88  Temp(Src) 98.8 °F (37.1 °C) (Oral)  Resp 16  Ht 67\"  Wt 307 lb  BMI 48.07 kg/m2  Sp (CPT=77067/17282); Future            Advise follow-up in 2 weeks for recheck of pedal edema and well woman exam.  Screening mammogram also ordered.

## 2017-05-20 ENCOUNTER — HOSPITAL ENCOUNTER (OUTPATIENT)
Dept: MAMMOGRAPHY | Age: 42
Discharge: HOME | End: 2017-05-20
Attending: EMERGENCY MEDICINE
Payer: COMMERCIAL

## 2017-05-20 DIAGNOSIS — Z12.39 BREAST CANCER SCREENING: ICD-10-CM

## 2017-05-20 PROCEDURE — 77067 SCR MAMMO BI INCL CAD: CPT | Performed by: EMERGENCY MEDICINE

## 2017-05-20 PROCEDURE — 77063 BREAST TOMOSYNTHESIS BI: CPT | Performed by: EMERGENCY MEDICINE

## 2017-06-01 ENCOUNTER — OFFICE VISIT (OUTPATIENT)
Dept: SLEEP CENTER | Facility: HOSPITAL | Age: 42
End: 2017-06-01
Attending: INTERNAL MEDICINE
Payer: COMMERCIAL

## 2017-06-01 PROCEDURE — 95810 POLYSOM 6/> YRS 4/> PARAM: CPT

## 2017-06-07 ENCOUNTER — OFFICE VISIT (OUTPATIENT)
Dept: OBGYN CLINIC | Facility: CLINIC | Age: 42
End: 2017-06-07

## 2017-06-07 VITALS
DIASTOLIC BLOOD PRESSURE: 84 MMHG | HEART RATE: 87 BPM | HEIGHT: 67.25 IN | SYSTOLIC BLOOD PRESSURE: 124 MMHG | BODY MASS INDEX: 45.45 KG/M2 | WEIGHT: 293 LBS

## 2017-06-07 DIAGNOSIS — Z12.4 CERVICAL CANCER SCREENING: Primary | ICD-10-CM

## 2017-06-07 DIAGNOSIS — Z11.3 SCREEN FOR STD (SEXUALLY TRANSMITTED DISEASE): ICD-10-CM

## 2017-06-07 DIAGNOSIS — Z30.09 ENCOUNTER FOR COUNSELING REGARDING CONTRACEPTION: ICD-10-CM

## 2017-06-07 DIAGNOSIS — Z01.419 WELL WOMAN EXAM WITH ROUTINE GYNECOLOGICAL EXAM: ICD-10-CM

## 2017-06-07 PROCEDURE — 99396 PREV VISIT EST AGE 40-64: CPT | Performed by: NURSE PRACTITIONER

## 2017-06-07 PROCEDURE — 87591 N.GONORRHOEAE DNA AMP PROB: CPT | Performed by: NURSE PRACTITIONER

## 2017-06-07 PROCEDURE — 87624 HPV HI-RISK TYP POOLED RSLT: CPT | Performed by: NURSE PRACTITIONER

## 2017-06-07 PROCEDURE — 87491 CHLMYD TRACH DNA AMP PROBE: CPT | Performed by: NURSE PRACTITIONER

## 2017-06-07 PROCEDURE — 88175 CYTOPATH C/V AUTO FLUID REDO: CPT | Performed by: NURSE PRACTITIONER

## 2017-06-07 RX ORDER — MISOPROSTOL 100 UG/1
TABLET ORAL
Qty: 2 TABLET | Refills: 0 | Status: SHIPPED | OUTPATIENT
Start: 2017-06-07 | End: 2017-08-21

## 2017-06-07 NOTE — PROCEDURES
[address] [address]       Accredited by the [facility] ([facility])    PATIENT'S NAME:        Milind Narayanan PHYSICIAN:   Cathryn Holbrook M.D.   REFERRING PHYSICIAN:   Nichelle Salas M.D. comprising 20.2% of sleep. REM sleep was seen in decreased amounts comprising 16.5% of sleep. RESPIRATORY MEASURES:  Respiratory monitoring showed obstructive apneas and hypopneas that became severe in REM sleep. The apnea-hypopnea index is 12.5.   The

## 2017-06-07 NOTE — PROGRESS NOTES
Here for new gynecology visit. 37year old G 2 P 2. Patient's last menstrual period was 05/27/2017 (exact date). .     Here for Annual Gynecologic Exam. Patient is interested in IUD, considering both options. Menses Q 28 days for 5-7 days.   Nothing for pain, swelling, arthralgias, joint swelling. Neurological:  No headaches, depression, anxiety, migraines, seizure disorders, behavioral problems.                    /84 mmHg  Pulse 87  Ht 67.25\"  Wt 298 lb  BMI 46.34 kg/m2  LMP 05/27/2017 ([employer]

## 2017-06-14 ENCOUNTER — APPOINTMENT (OUTPATIENT)
Dept: LAB | Age: 42
End: 2017-06-14
Attending: EMERGENCY MEDICINE
Payer: COMMERCIAL

## 2017-06-14 DIAGNOSIS — Z13.0 SCREENING FOR ENDOCRINE, NUTRITIONAL, METABOLIC AND IMMUNITY DISORDER: ICD-10-CM

## 2017-06-14 DIAGNOSIS — Z13.228 SCREENING FOR ENDOCRINE, NUTRITIONAL, METABOLIC AND IMMUNITY DISORDER: ICD-10-CM

## 2017-06-14 DIAGNOSIS — Z13.29 SCREENING FOR ENDOCRINE, NUTRITIONAL, METABOLIC AND IMMUNITY DISORDER: ICD-10-CM

## 2017-06-14 DIAGNOSIS — Z13.21 SCREENING FOR ENDOCRINE, NUTRITIONAL, METABOLIC AND IMMUNITY DISORDER: ICD-10-CM

## 2017-06-14 PROCEDURE — 82306 VITAMIN D 25 HYDROXY: CPT | Performed by: EMERGENCY MEDICINE

## 2017-06-14 PROCEDURE — 80061 LIPID PANEL: CPT | Performed by: EMERGENCY MEDICINE

## 2017-06-14 PROCEDURE — 36415 COLL VENOUS BLD VENIPUNCTURE: CPT | Performed by: EMERGENCY MEDICINE

## 2017-06-14 PROCEDURE — 83036 HEMOGLOBIN GLYCOSYLATED A1C: CPT | Performed by: EMERGENCY MEDICINE

## 2017-06-14 PROCEDURE — 84443 ASSAY THYROID STIM HORMONE: CPT | Performed by: EMERGENCY MEDICINE

## 2017-07-11 ENCOUNTER — TELEPHONE (OUTPATIENT)
Dept: OBGYN CLINIC | Facility: CLINIC | Age: 42
End: 2017-07-11

## 2017-07-11 RX ORDER — MISOPROSTOL 100 UG/1
TABLET ORAL
Qty: 2 TABLET | Refills: 0 | Status: SHIPPED | OUTPATIENT
Start: 2017-07-11 | End: 2017-08-21

## 2017-07-11 NOTE — TELEPHONE ENCOUNTER
Per PSR patient filled original RX and took 1 tablet in anticipation of IUD insertion but had to cancel. I will duplicate RX as previously prescribed, please notify patient.

## 2017-07-11 NOTE — TELEPHONE ENCOUNTER
Patient called, and she re scheduled her  IUD insert, mirena with you on July 28th. You supposed to call in Misoprostol 100 mcg to take 1 tab night before and 1 tab morning of procedure which was originally you wanted her to do.   Please call it in to her

## 2017-07-14 ENCOUNTER — LAB ENCOUNTER (OUTPATIENT)
Dept: LAB | Age: 42
End: 2017-07-14
Attending: EMERGENCY MEDICINE
Payer: COMMERCIAL

## 2017-07-14 DIAGNOSIS — F50.89 PICA IN ADULTS: ICD-10-CM

## 2017-07-14 LAB
BASOPHILS # BLD AUTO: 0.11 X10(3) UL (ref 0–0.1)
BASOPHILS NFR BLD AUTO: 1.4 %
DEPRECATED HBV CORE AB SER IA-ACNC: 6.4 NG/ML (ref 10–291)
EOSINOPHIL # BLD AUTO: 0.09 X10(3) UL (ref 0–0.3)
EOSINOPHIL NFR BLD AUTO: 1.2 %
ERYTHROCYTE [DISTWIDTH] IN BLOOD BY AUTOMATED COUNT: 15.4 % (ref 11.5–16)
FOLATE (FOLIC ACID), SERUM: 11.6 NG/ML (ref 8.7–24)
HAV AB SERPL IA-ACNC: 403 PG/ML (ref 193–986)
HCT VFR BLD AUTO: 38.1 % (ref 34–50)
HGB BLD-MCNC: 11.5 G/DL (ref 12–16)
IMMATURE GRANULOCYTE COUNT: 0.04 X10(3) UL (ref 0–1)
IMMATURE GRANULOCYTE RATIO %: 0.5 %
IRON SATURATION: 6 % (ref 13–45)
IRON: 37 UG/DL (ref 28–170)
LYMPHOCYTES # BLD AUTO: 1.86 X10(3) UL (ref 0.9–4)
LYMPHOCYTES NFR BLD AUTO: 23.8 %
MCH RBC QN AUTO: 25.8 PG (ref 27–33.2)
MCHC RBC AUTO-ENTMCNC: 30.2 G/DL (ref 31–37)
MCV RBC AUTO: 85.4 FL (ref 81–100)
MONOCYTES # BLD AUTO: 0.61 X10(3) UL (ref 0.1–0.6)
MONOCYTES NFR BLD AUTO: 7.8 %
NEUTROPHIL ABS PRELIM: 5.09 X10 (3) UL (ref 1.3–6.7)
NEUTROPHILS # BLD AUTO: 5.09 X10(3) UL (ref 1.3–6.7)
NEUTROPHILS NFR BLD AUTO: 65.3 %
PLATELET # BLD AUTO: 326 10(3)UL (ref 150–450)
RBC # BLD AUTO: 4.46 X10(6)UL (ref 3.8–5.1)
RED CELL DISTRIBUTION WIDTH-SD: 47.4 FL (ref 35.1–46.3)
TOTAL IRON BINDING CAPACITY: 586 UG/DL (ref 298–536)
TRANSFERRIN: 393 MG/DL (ref 200–360)
WBC # BLD AUTO: 7.8 X10(3) UL (ref 4–13)

## 2017-07-14 PROCEDURE — 83540 ASSAY OF IRON: CPT | Performed by: EMERGENCY MEDICINE

## 2017-07-14 PROCEDURE — 82728 ASSAY OF FERRITIN: CPT | Performed by: EMERGENCY MEDICINE

## 2017-07-14 PROCEDURE — 85025 COMPLETE CBC W/AUTO DIFF WBC: CPT | Performed by: EMERGENCY MEDICINE

## 2017-07-14 PROCEDURE — 82746 ASSAY OF FOLIC ACID SERUM: CPT | Performed by: EMERGENCY MEDICINE

## 2017-07-14 PROCEDURE — 36415 COLL VENOUS BLD VENIPUNCTURE: CPT | Performed by: EMERGENCY MEDICINE

## 2017-07-14 PROCEDURE — 83550 IRON BINDING TEST: CPT | Performed by: EMERGENCY MEDICINE

## 2017-07-14 PROCEDURE — 82607 VITAMIN B-12: CPT | Performed by: EMERGENCY MEDICINE

## 2017-07-14 NOTE — PATIENT INSTRUCTIONS
Thank you for choosing [facility]  To Do:  [address]  1. Have blood tests drawn today  2. Follow up in 1 month for recheck  3. Follow up with Weight loss clinic  4. Start phentermine  5.  Start OTC ferrous sulfate 325 mg 1 tab 2-3 sharla not listed on the food label, look for it in the ingredients in the form of hydrogenated or partially hydrogenated oils. · This is found in snack foods, shortening, french fries, and stick margarines.   Add flavor without fat  · Sprinkle herbs on fish, chi you burn. · Exercise gives you energy and curbs your appetite. · Exercise decreases stress and helps you sleep better. Make exercise fun  Exercise can be fun. Choose an activity you enjoy.  You may even get a friend to do it with you:  · Take a resistanc exercise after they lose weight are more likely to keep the weight off. Fiction: “The fewer calories I eat, the better.”  Fact: This seems like it should be true, but it’s not. When you eat too few calories, your body acts as if it’s on a desert [address].  I specific weight. You may decide on a fitness goal (such as being able to walk 10 miles a week), or a health goal (such as lowering your blood pressure). Choose a goal that is measurable and reasonable, so you know when you've reached it.  A goal of reaching

## 2017-07-14 NOTE — PROGRESS NOTES
Chief Complaint:   Patient presents with: Follow - Up: Lab results,     HPI:   This is a 37year old female     C/O PICA,  Unusually craveing ice unusually. Sx ongoing for 1 month. NO weakness. No decrease in activity level. No depressive Sx.  No hallucina pill PO the morning of procedure Disp: 2 tablet Rfl: 0     No current facility-administered medications on file prior to visit.      Allergies   No Known Allergies    Current Outpatient Prescriptions on File Prior to Visit:  OMEPRAZOLE 40 MG Oral Capsule Rodri Pruett with reports of heavy menses. Start OTC Iron supplementation  -     CBC WITH DIFFERENTIAL WITH PLATELET; Future  -     IRON AND TIBC; Future  -     FERRITIN; Future  -     VITAMIN B12; Future  -     FOLIC ACID SERUM(FOLATE);  Future    Morbid obesity with

## 2017-07-19 DIAGNOSIS — D50.9 IRON DEFICIENCY ANEMIA, UNSPECIFIED IRON DEFICIENCY ANEMIA TYPE: Primary | ICD-10-CM

## 2017-07-20 ENCOUNTER — OFFICE VISIT (OUTPATIENT)
Dept: OBGYN CLINIC | Facility: CLINIC | Age: 42
End: 2017-07-20

## 2017-07-20 VITALS — DIASTOLIC BLOOD PRESSURE: 84 MMHG | SYSTOLIC BLOOD PRESSURE: 110 MMHG

## 2017-07-20 DIAGNOSIS — Z30.430 ENCOUNTER FOR INSERTION OF INTRAUTERINE CONTRACEPTIVE DEVICE: ICD-10-CM

## 2017-07-20 DIAGNOSIS — Z30.430 ENCOUNTER FOR IUD INSERTION: Primary | ICD-10-CM

## 2017-07-20 LAB
CONTROL LINE PRESENT WITH A CLEAR BACKGROUND (YES/NO): YES YES/NO
PREGNANCY TEST, URINE: NEGATIVE

## 2017-07-20 PROCEDURE — 58300 INSERT INTRAUTERINE DEVICE: CPT | Performed by: NURSE PRACTITIONER

## 2017-07-20 PROCEDURE — 81025 URINE PREGNANCY TEST: CPT | Performed by: NURSE PRACTITIONER

## 2017-07-20 NOTE — PROGRESS NOTES
Procedure:    S:  The patient was consented for Mirena placement. Risks and benefits were discussed including infection, uterine perforation, uterine expulsion, PID, ectopic and intrauterine pregnancy. All questions were answered.     O:  The patient was p

## 2017-08-14 RX ORDER — OMEPRAZOLE 40 MG/1
CAPSULE, DELAYED RELEASE ORAL
Qty: 90 CAPSULE | Refills: 0 | Status: SHIPPED | OUTPATIENT
Start: 2017-08-14 | End: 2017-11-21

## 2017-08-14 NOTE — TELEPHONE ENCOUNTER
LF: 5/5/2017    LOV: 7/14/2017    Pending Prescriptions Disp Refills    OMEPRAZOLE 40 MG Oral Capsule Delayed Release [Pharmacy Med Name: OMEPRAZOLE 40MG CAPSULES] 90 capsule 0     Sig: TAKE 1 CAPSULE BY MOUTH EVERY DAY         Please approve or deny Rx.

## 2017-08-21 ENCOUNTER — OFFICE VISIT (OUTPATIENT)
Dept: OBGYN CLINIC | Facility: CLINIC | Age: 42
End: 2017-08-21

## 2017-08-21 VITALS
HEIGHT: 67 IN | WEIGHT: 293 LBS | DIASTOLIC BLOOD PRESSURE: 82 MMHG | HEART RATE: 90 BPM | SYSTOLIC BLOOD PRESSURE: 128 MMHG | BODY MASS INDEX: 45.99 KG/M2

## 2017-08-21 DIAGNOSIS — Z30.431 IUD CHECK UP: Primary | ICD-10-CM

## 2017-08-21 PROCEDURE — 99396 PREV VISIT EST AGE 40-64: CPT | Performed by: NURSE PRACTITIONER

## 2017-08-21 NOTE — PROGRESS NOTES
S:  Patient here for a follow up after having the Mirena IUD inserted 7/20/17. She states her menses came 7/5/17 and she is still spotting brown since. Her menses was lighter than her usual period. No other concerns or questions.     O:  Vagina pink without

## 2017-09-04 ENCOUNTER — PATIENT MESSAGE (OUTPATIENT)
Dept: OBGYN CLINIC | Facility: CLINIC | Age: 42
End: 2017-09-04

## 2017-09-05 NOTE — TELEPHONE ENCOUNTER
Continuous spotting is normal for up to 6 months after a Mirena is inserted. When did the menses like flow start?  I would only be concerned if menses flow is very heavy, or lasting prolonged episodes (>8 days.)

## 2017-09-05 NOTE — TELEPHONE ENCOUNTER
From: Alex Medrano  To: HANK Hodge  Sent: 9/4/2017 10:22 AM CDT  Subject: Visit Follow-up Question    Hi there,  You told me to let you know if I got my period and I did. It's not the brown spotting, it's a full flow menstrual cycle.  The

## 2017-09-21 ENCOUNTER — OFFICE VISIT (OUTPATIENT)
Dept: FAMILY MEDICINE CLINIC | Facility: CLINIC | Age: 42
End: 2017-09-21

## 2017-09-21 VITALS
OXYGEN SATURATION: 98 % | DIASTOLIC BLOOD PRESSURE: 66 MMHG | RESPIRATION RATE: 16 BRPM | BODY MASS INDEX: 46 KG/M2 | HEART RATE: 77 BPM | TEMPERATURE: 99 F | SYSTOLIC BLOOD PRESSURE: 112 MMHG | WEIGHT: 293 LBS

## 2017-09-21 DIAGNOSIS — N30.00 ACUTE CYSTITIS WITHOUT HEMATURIA: Primary | ICD-10-CM

## 2017-09-21 LAB
APPEARANCE: CLEAR
BILIRUBIN: NEGATIVE
GLUCOSE (URINE DIPSTICK): NEGATIVE MG/DL
KETONES (URINE DIPSTICK): NEGATIVE MG/DL
MULTISTIX LOT#: ABNORMAL NUMERIC
NITRITE, URINE: NEGATIVE
PH, URINE: 5.5 (ref 4.5–8)
SPECIFIC GRAVITY: 1.03 (ref 1–1.03)
URINE-COLOR: YELLOW
UROBILINOGEN,SEMI-QN: 0.2 MG/DL (ref 0–1.9)

## 2017-09-21 PROCEDURE — 87186 SC STD MICRODIL/AGAR DIL: CPT | Performed by: EMERGENCY MEDICINE

## 2017-09-21 PROCEDURE — 99213 OFFICE O/P EST LOW 20 MIN: CPT | Performed by: EMERGENCY MEDICINE

## 2017-09-21 PROCEDURE — 87088 URINE BACTERIA CULTURE: CPT | Performed by: EMERGENCY MEDICINE

## 2017-09-21 PROCEDURE — 87086 URINE CULTURE/COLONY COUNT: CPT | Performed by: EMERGENCY MEDICINE

## 2017-09-21 PROCEDURE — 81003 URINALYSIS AUTO W/O SCOPE: CPT | Performed by: EMERGENCY MEDICINE

## 2017-09-21 RX ORDER — CIPROFLOXACIN 500 MG/1
500 TABLET, FILM COATED ORAL 2 TIMES DAILY
Qty: 14 TABLET | Refills: 0 | Status: SHIPPED | OUTPATIENT
Start: 2017-09-21 | End: 2017-09-28

## 2017-09-21 RX ORDER — PHENAZOPYRIDINE HYDROCHLORIDE 200 MG/1
200 TABLET, FILM COATED ORAL 3 TIMES DAILY PRN
Qty: 6 TABLET | Refills: 0 | Status: SHIPPED | OUTPATIENT
Start: 2017-09-21 | End: 2017-11-26

## 2017-09-21 NOTE — PROGRESS NOTES
Chief Complaint:   Patient presents with:  Urinary Symptoms (urologic): pressure, frequency, urgency, painful urination x 3 days    HPI:   This is a 37year old female     DYSURIA  + Dysuria x 3 d + freq and urgency  + foul smelling D/C.   No vaginal bleedi MG,) 20 MCG/24HR Intrauterine IUD 1 each by Intrauterine route once.  Disp:  Rfl:    OMEPRAZOLE 40 MG Oral Capsule Delayed Release TAKE 1 CAPSULE BY MOUTH EVERY DAY Disp: 90 capsule Rfl: 0   Phentermine HCl 37.5 MG Oral Tab Take 1 tablet (37.5 mg total) by Candida if with persistent symptoms. Will treat for UTI. Rx ciprofloxacin and Pyridium. Urine culture ordered. Finish all antibiotics as prescribed to prevent resistance. Drink at least 8 x 8 oz. cups of fluids per day.    Wipe from front to back when

## 2017-09-21 NOTE — PATIENT INSTRUCTIONS
Thank you for choosing [facility]  To Do:  [address]  1. Finish all antibiotics as prescribed to prevent resistance. 2. Drink at least 8 x 8 oz. cups of fluids per day. 3. Wipe from front to back when using toilet.    4. Urinate a 9/8/2014  © 5326-1980 [facility] [address] [place] [address], [address] [place] [address]. All rights reserved. This information is not intended as a substitute for professional medical care. Always follow your healthcare professional's instructions. taking the antibiotic too soon, the infection may not go away, and you may develop a resistance to the antibiotic. This can make it much harder to treat.   Lifestyle changes to treat and prevent UTIs  The lifestyle changes below will help get rid of your UT

## 2017-11-21 RX ORDER — OMEPRAZOLE 40 MG/1
CAPSULE, DELAYED RELEASE ORAL
Qty: 90 CAPSULE | Refills: 0 | Status: SHIPPED | OUTPATIENT
Start: 2017-11-21 | End: 2018-02-19

## 2017-11-26 PROBLEM — T56.892A INTENTIONAL LITHIUM OVERDOSE (HCC): Status: ACTIVE | Noted: 2017-11-26

## 2017-11-26 PROBLEM — T50.902A SUICIDAL OVERDOSE, INITIAL ENCOUNTER (HCC): Status: ACTIVE | Noted: 2017-11-26

## 2017-11-26 PROBLEM — T56.892A: Status: ACTIVE | Noted: 2017-11-26

## 2017-11-26 NOTE — ED PROVIDER NOTES
Discussed and all the way need to talk to the nursing staff  Patient Seen in: [facility] Emergency Department In [place]    History   Patient presents with:  Eval-P (psychiatric)    Stated Complaint: eval p    HPI    This is a 81-year-old female with past m Pulse 97   Temp 97.7 °F (36.5 °C) (Temporal)   Resp 18   Ht 170.2 cm (5' 7\")   Wt 134.3 kg   LMP 11/19/2017   SpO2 100%   BMI 46.36 kg/m²         Physical Exam    GENERAL: Awake, alert oriented x3, nontoxic appearing. Tearful and cooperative.   Makes eye DRAW BLUE   RAINBOW DRAW LAVENDER   RAINBOW DRAW LIGHT GREEN   RAINBOW DRAW GOLD     EKG    Rate, intervals and axes as noted on EKG Report.   Rate:93  Rhythm: Sinus Rhythm  Reading: No acute changes           ED Course as of Nov 26 0359  ------------------

## 2017-11-26 NOTE — ED INITIAL ASSESSMENT (HPI)
Pt states she tried to kill herself tonight by taking a handful of lithium and 2 pills of ativan. Pt admits to drinking vodka tonight.

## 2017-11-26 NOTE — PROGRESS NOTES
11/26/17 7662   Clinical Encounter Type   Visited With Patient and family together  (, Nely York, at bedside)   Routine Visit Introduction   Continue Visiting No   Patient's Supportive Strategies/Resources  provided emotional support, includi

## 2017-11-26 NOTE — PLAN OF CARE
Risk for Violence-Violent Restraints/Seclusion    • Patient will not express any violent or self-destructive behaviors Completed          GASTROINTESTINAL - ADULT    • Minimal or absence of nausea and vomiting Progressing        Pt calm & cooperative.  Tear

## 2017-11-26 NOTE — PROGRESS NOTES
Lian Edwards, RN Registered Nurse Signed Nursing  [address] Preadmission Intake Note Date of Service: 11/26/2017  2:01 PM   Related encounter: Assessment from 11/26/2017 in [facility]          []Manual[]Template  []Copied     [address] Pread[address] Unit: Adult  Inpatient Criteria: 24 hr behavior monitoring;Suicidal/homicidal risk  Behavioral Precautions: Suicide           Primary Psychiatric Diagnosis  Anxiety Disorders: Unspecified Anxiety Disorder  Bipolar and Related Disorders: Bipolar Disorder, C

## 2017-11-26 NOTE — H&P
JODI HOSPITALIST  History and Physical     Lonzie Mail Patient Status:  Emergency    1975 MRN [id number]   Location [facility] Attending Lon Cano, [address] [facility] Day # 0 PCP Aleksandra Sinha MD     Chi Capsule Delayed Release TAKE 1 CAPSULE BY MOUTH EVERY DAY Disp: 90 capsule Rfl: 0   Levonorgestrel (MIRENA, 52 MG,) 20 MCG/24HR Intrauterine IUD 1 each by Intrauterine route once.  Disp:  Rfl:    BuPROPion HCl ER, XL, 150 MG Oral Tablet 24 Hr TK 1 T PO QAM disorder  4. Depression  5. Anxiety   6. YING  7.  Hypokalemia    Plan:  Admit to ICU  IVF  Replete potassium  Check Li now and q 3 hours  Check TSH - added  Await Utox  Monitor for nausea, vomiting, diarrhea, mental status changes, weakness, seizures  Telem

## 2017-11-26 NOTE — PLAN OF CARE
GASTROINTESTINAL - ADULT    • Minimal or absence of nausea and vomiting Not Progressing          CARDIOVASCULAR - ADULT    • Maintains optimal cardiac output and hemodynamic stability Progressing        NEUROLOGICAL - ADULT    • Achieves stable or improved

## 2017-11-26 NOTE — PROGRESS NOTES
[facility] Progress Note:  Anticoagulation Weight Dose Adjustment for heparin    Pavan Daniels is a 40year old female who has been prescribed heparin for VTE prophylaxis.       Estimated Creatinine Clearance: 86.7 mL/min (based on SCr of 0.83 mg/dL)

## 2017-11-26 NOTE — CONSULTS
[address]    PATIENT'S NAME: Emelia Cobb   ATTENDING PHYSICIAN: DEE Rudolph [facility] Hal Celestin MD   PATIENT ACCOUNT#:   [id number]    LOCATION:  [address] [place] [address]  MEDICAL RECORD #:   [id number]       DATE OF BIRTH: stepbrother. 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 MENTAL STATUS EXAMINATION:  She was alert, oriented, cooperative, engaged, depressed, tearful. Thoughts were organized, goal directed.   She is demonstrating good insight

## 2017-11-26 NOTE — PROGRESS NOTES
ICU  Critical Care APN Progress Note    NAME: Manny Cano - ROOM: [address] [place] [address] - MRN: [id number] - Age: 40year old - :1975    History Of Present Illness:  Manny Cano is a 40year old female with PMHx significant for YING, anxiety, depre auscultation bilaterally, respirations unlabored  Heart: Regular rate and rhythm, S1 and S2 normal, no murmur, rub or gallop  Abdomen: Soft, obese, non-tender, bowel sounds active all four quadrants, no masses, no organomegaly  Extremities: Extremities nor

## 2017-11-27 PROBLEM — F31.9 BIPOLAR DISORDER, UNSPECIFIED (HCC): Status: ACTIVE | Noted: 2017-11-27

## 2017-11-27 NOTE — PLAN OF CARE
GASTROINTESTINAL - ADULT    • Minimal or absence of nausea and vomiting Progressing          NEUROLOGICAL - ADULT    • Achieves stable or improved neurological status Progressing    • Absence of seizures Progressing    • Remains free of injury related to s

## 2017-11-27 NOTE — PROGRESS NOTES
Dr. Rylan Barnett accepts pt to [facility] adult [address]. RN to RN given: #[id number]

## 2018-02-06 ENCOUNTER — OFFICE VISIT (OUTPATIENT)
Dept: FAMILY MEDICINE CLINIC | Facility: CLINIC | Age: 43
End: 2018-02-06

## 2018-02-06 VITALS
BODY MASS INDEX: 45.9 KG/M2 | DIASTOLIC BLOOD PRESSURE: 74 MMHG | HEART RATE: 74 BPM | WEIGHT: 289 LBS | HEIGHT: 66.5 IN | OXYGEN SATURATION: 99 % | RESPIRATION RATE: 16 BRPM | SYSTOLIC BLOOD PRESSURE: 110 MMHG | TEMPERATURE: 98 F

## 2018-02-06 DIAGNOSIS — J20.9 ACUTE BRONCHITIS WITH BRONCHOSPASM: Primary | ICD-10-CM

## 2018-02-06 DIAGNOSIS — J00 ACUTE NASOPHARYNGITIS: ICD-10-CM

## 2018-02-06 PROCEDURE — 99213 OFFICE O/P EST LOW 20 MIN: CPT | Performed by: PHYSICIAN ASSISTANT

## 2018-02-06 RX ORDER — CEFDINIR 300 MG/1
300 CAPSULE ORAL 2 TIMES DAILY
Qty: 20 CAPSULE | Refills: 0 | Status: SHIPPED | OUTPATIENT
Start: 2018-02-06 | End: 2018-02-16

## 2018-02-06 RX ORDER — ALBUTEROL SULFATE 90 UG/1
2 AEROSOL, METERED RESPIRATORY (INHALATION) EVERY 4 HOURS PRN
Qty: 1 INHALER | Refills: 0 | Status: SHIPPED | OUTPATIENT
Start: 2018-02-06 | End: 2018-03-08

## 2018-02-06 RX ORDER — METHYLPREDNISOLONE 4 MG/1
TABLET ORAL
Qty: 1 PACKAGE | Refills: 0 | Status: SHIPPED | OUTPATIENT
Start: 2018-02-06 | End: 2018-02-23 | Stop reason: ALTCHOICE

## 2018-02-06 NOTE — PROGRESS NOTES
CHIEF COMPLAINT:   Patient presents with:  Cough    HPI:   Uma Liu is a 40year old female who presents for upper and lower respiratory symptoms for  5 days.  Patient reports body aches, sore throat, congestion, low grade fever, cough with thick, • YING (obstructive sleep apnea) EDW PSG-17    AHI 12.5 REM AHI 47 Supine AHI 16.5  83%   • Visual impairment       Past Surgical History:  No date: APPENDECTOMY  No date: APPENDECTOMY  No date:       Comment: 2 c-sections  2017: CAMERON Wenceslao Current is a 40year old female who presents with     ASSESSMENT: Acute bronchitis with bronchospasm  (primary encounter diagnosis)  Acute nasopharyngitis     PLAN: Meds as below- hold abx as viral at this time, start for persistent symptoms >10 Acute bronchitis almost always starts as a viral respiratory infection, such as a cold or the flu. Certain factors make it more likely for a cold or flu to turn into bronchitis.  These include being very young, being elderly, having a heart or lung problem, Follow up with your doctor as you are told. You will likely feel better in a week or two. But a dry cough can linger beyond that time.  Let your doctor know if you still have symptoms (other than a dry cough) after 2 weeks, or if you’re prone to getting bro · Drink lots of water or other fluids (at least 10 glasses a day) during an attack. This will loosen lung secretions and make it easier to breathe. If you have heart or kidney disease, check with your doctor before you drink extra fluids.   · Take prescribe The patient is asked to see PCP if sx's persist or worsen.

## 2018-02-06 NOTE — PATIENT INSTRUCTIONS
Acute Bronchitis  Your healthcare provider has told you that you have acute bronchitis. Bronchitis is infection or inflammation of the bronchial tubes (airways in the lungs). Normally, air moves easily in and out of the airways.  Bronchitis narrows the ai · Drink plenty of fluids, such as water, juice, or warm soup. Fluids loosen mucus so that you can cough it up. This helps you breathe more easily. Fluids also prevent dehydration. · Make sure you get plenty of rest.  · Do not smoke.  Do not allow anyone el Being exposed to harmful fumes, a recent case of bronchitis, exercise, or a flare-up of chronic obstructive pulmonary disease (COPD) may cause the airways to spasm. An episode of bronchospasm may last 7 to 14 days.  Medicine may be prescribed to relax the a · Chest pain with each breath  · Increased wheezing or shortness of breath  Date Last Reviewed: 9/13/2015  © 7421-2626 [employer] [address]. [address] [facility], [address] [place] [address]. All rights reserved.  This information is not intended as a substitu

## 2018-02-19 RX ORDER — OMEPRAZOLE 40 MG/1
40 CAPSULE, DELAYED RELEASE ORAL
Qty: 30 CAPSULE | Refills: 0 | Status: SHIPPED | OUTPATIENT
Start: 2018-02-19 | End: 2018-03-19

## 2018-02-19 NOTE — TELEPHONE ENCOUNTER
Please approve or deny  LOV 7/14/18  LF 11/21/18  Was in hospital 11/2017 for suicide attempt  Can not find last physical

## 2018-02-19 NOTE — TELEPHONE ENCOUNTER
Pt called for OMEPRAZOLE 40 MG Oral Capsule Delayed Release refill. Pt tried to make an appt but needs after 4pm. Pls refill.

## 2018-02-23 ENCOUNTER — LAB ENCOUNTER (OUTPATIENT)
Dept: LAB | Age: 43
End: 2018-02-23
Attending: FAMILY MEDICINE
Payer: COMMERCIAL

## 2018-02-23 DIAGNOSIS — F41.9 ANXIETY AND DEPRESSION: ICD-10-CM

## 2018-02-23 DIAGNOSIS — F31.81 BIPOLAR 2 DISORDER (HCC): ICD-10-CM

## 2018-02-23 DIAGNOSIS — D50.9 IRON DEFICIENCY ANEMIA, UNSPECIFIED IRON DEFICIENCY ANEMIA TYPE: ICD-10-CM

## 2018-02-23 DIAGNOSIS — F32.A ANXIETY AND DEPRESSION: ICD-10-CM

## 2018-02-23 LAB
BASOPHILS # BLD AUTO: 0.11 X10(3) UL (ref 0–0.1)
BASOPHILS NFR BLD AUTO: 1.1 %
DEPRECATED HBV CORE AB SER IA-ACNC: 21.3 NG/ML (ref 10–291)
EOSINOPHIL # BLD AUTO: 0.09 X10(3) UL (ref 0–0.3)
EOSINOPHIL NFR BLD AUTO: 0.9 %
ERYTHROCYTE [DISTWIDTH] IN BLOOD BY AUTOMATED COUNT: 12.8 % (ref 11.5–16)
HCT VFR BLD AUTO: 42.2 % (ref 34–50)
HGB BLD-MCNC: 13.4 G/DL (ref 12–16)
IMMATURE GRANULOCYTE COUNT: 0.02 X10(3) UL (ref 0–1)
IMMATURE GRANULOCYTE RATIO %: 0.2 %
IRON SATURATION: 20 % (ref 13–45)
IRON: 100 UG/DL (ref 28–170)
LYMPHOCYTES # BLD AUTO: 2.19 X10(3) UL (ref 0.9–4)
LYMPHOCYTES NFR BLD AUTO: 22.7 %
MCH RBC QN AUTO: 28.4 PG (ref 27–33.2)
MCHC RBC AUTO-ENTMCNC: 31.8 G/DL (ref 31–37)
MCV RBC AUTO: 89.4 FL (ref 81–100)
MONOCYTES # BLD AUTO: 0.79 X10(3) UL (ref 0.1–1)
MONOCYTES NFR BLD AUTO: 8.2 %
NEUTROPHIL ABS PRELIM: 6.44 X10 (3) UL (ref 1.3–6.7)
NEUTROPHILS # BLD AUTO: 6.44 X10(3) UL (ref 1.3–6.7)
NEUTROPHILS NFR BLD AUTO: 66.9 %
PLATELET # BLD AUTO: 324 10(3)UL (ref 150–450)
RBC # BLD AUTO: 4.72 X10(6)UL (ref 3.8–5.1)
RED CELL DISTRIBUTION WIDTH-SD: 41.9 FL (ref 35.1–46.3)
TOTAL IRON BINDING CAPACITY: 502 UG/DL (ref 298–536)
TRANSFERRIN: 337 MG/DL (ref 200–360)
VALPROIC ACID: 50.9 UG/ML (ref 50–100)
WBC # BLD AUTO: 9.6 X10(3) UL (ref 4–13)

## 2018-02-23 PROCEDURE — 85025 COMPLETE CBC W/AUTO DIFF WBC: CPT | Performed by: EMERGENCY MEDICINE

## 2018-02-23 PROCEDURE — 80164 ASSAY DIPROPYLACETIC ACD TOT: CPT | Performed by: FAMILY MEDICINE

## 2018-02-23 PROCEDURE — 83540 ASSAY OF IRON: CPT | Performed by: EMERGENCY MEDICINE

## 2018-02-23 PROCEDURE — 36415 COLL VENOUS BLD VENIPUNCTURE: CPT | Performed by: EMERGENCY MEDICINE

## 2018-02-23 PROCEDURE — 82728 ASSAY OF FERRITIN: CPT | Performed by: EMERGENCY MEDICINE

## 2018-02-23 PROCEDURE — 83550 IRON BINDING TEST: CPT | Performed by: EMERGENCY MEDICINE

## 2018-02-23 NOTE — PROGRESS NOTES
[facility] Family Medicine Office Note  Chief Complaint:   Patient presents with:  Medication Request      HPI:   This is a 40year old female coming in for  HPI  She is a 52-year-old female here for follow-up for bipolar disorder as well as depr Bipolar Disorder Daughter    • Depression Sister    • Substance Abuse Brother    • Depression Sister      Allergies:  No Known Allergies  Current Meds:    Current Outpatient Prescriptions:  divalproex Sodium 250 MG Oral Tab EC DR tab Take 1 tablet (250 mg encounter: 67.5\". Weight as of this encounter: 290 lb. Vital signs reviewed. Appears stated age, well groomed. Physical Exam   Nursing note and vitals reviewed. Constitutional: She is oriented to person, place, and time.  She appears well-developed are no barriers to learning. Medical education done. Outcome: Patient verbalizes understanding. Patient is notified to call with any questions, complications, allergies, or worsening or changing symptoms.   Patient is to call with any side effects or comp

## 2018-03-19 RX ORDER — OMEPRAZOLE 40 MG/1
CAPSULE, DELAYED RELEASE ORAL
Qty: 30 CAPSULE | Refills: 0 | Status: SHIPPED | OUTPATIENT
Start: 2018-03-19 | End: 2018-04-15

## 2018-03-19 NOTE — TELEPHONE ENCOUNTER
Medication(s) to Refill:   Pending Prescriptions Disp Refills    OMEPRAZOLE 40 MG Oral Capsule Delayed Release [Pharmacy Med Name: OMEPRAZOLE 40MG CAPSULES] 30 capsule 0     Sig: TAKE 1 CAPSULE(40 MG) BY MOUTH EVERY DAY             Reason for Medication Re

## 2018-04-16 RX ORDER — OMEPRAZOLE 40 MG/1
CAPSULE, DELAYED RELEASE ORAL
Qty: 30 CAPSULE | Refills: 0 | Status: SHIPPED | OUTPATIENT
Start: 2018-04-16 | End: 2018-05-03

## 2018-05-03 RX ORDER — OMEPRAZOLE 40 MG/1
CAPSULE, DELAYED RELEASE ORAL
Qty: 30 CAPSULE | Refills: 0 | Status: SHIPPED | OUTPATIENT
Start: 2018-05-03 | End: 2018-05-09

## 2018-05-03 NOTE — TELEPHONE ENCOUNTER
Pt requesting refill on OMEPRAZOLE 40 MG Oral Capsule Delayed Release be sent to her Ger on [address] [facility] pt has an appointment on 5/9 but her heartburn is really bad

## 2018-05-09 ENCOUNTER — OFFICE VISIT (OUTPATIENT)
Dept: FAMILY MEDICINE CLINIC | Facility: CLINIC | Age: 43
End: 2018-05-09

## 2018-05-09 VITALS
WEIGHT: 293 LBS | BODY MASS INDEX: 45.45 KG/M2 | SYSTOLIC BLOOD PRESSURE: 118 MMHG | OXYGEN SATURATION: 98 % | TEMPERATURE: 98 F | RESPIRATION RATE: 16 BRPM | HEIGHT: 67.5 IN | HEART RATE: 83 BPM | DIASTOLIC BLOOD PRESSURE: 72 MMHG

## 2018-05-09 DIAGNOSIS — K21.9 GASTROESOPHAGEAL REFLUX DISEASE, ESOPHAGITIS PRESENCE NOT SPECIFIED: Primary | ICD-10-CM

## 2018-05-09 PROCEDURE — 99213 OFFICE O/P EST LOW 20 MIN: CPT | Performed by: PHYSICIAN ASSISTANT

## 2018-05-09 RX ORDER — RANITIDINE 150 MG/1
150 CAPSULE ORAL 2 TIMES DAILY
Qty: 60 CAPSULE | Refills: 0 | Status: SHIPPED | OUTPATIENT
Start: 2018-05-09 | End: 2018-06-05

## 2018-05-09 RX ORDER — OMEPRAZOLE 40 MG/1
CAPSULE, DELAYED RELEASE ORAL
Qty: 30 CAPSULE | Refills: 0 | Status: SHIPPED | OUTPATIENT
Start: 2018-05-09 | End: 2018-10-15

## 2018-05-09 NOTE — PROGRESS NOTES
CHIEF COMPLAINT:   Patient presents with:  Medication Follow-Up        HPI:   Gallo Dc is a 40year old female who presents for medication refill of omeprazole. Family member threw out medication by mistake.  She has been on medication for about REVIEW OF SYSTEMS:   GENERAL HEALTH: feels well otherwise. No fever, chills, or malaise.    CARDIOVASCULAR: denies chest pain or palpitations  RESPIRATORY: denies shortness of breath, cough or wheezing  GI:See HPI  : denies urinary complaints  NEURO:

## 2018-06-05 DIAGNOSIS — K21.9 GASTROESOPHAGEAL REFLUX DISEASE, ESOPHAGITIS PRESENCE NOT SPECIFIED: ICD-10-CM

## 2018-06-05 RX ORDER — RANITIDINE 150 MG/1
CAPSULE ORAL
Qty: 60 CAPSULE | Refills: 0 | Status: SHIPPED | OUTPATIENT
Start: 2018-06-05 | End: 2018-10-15 | Stop reason: ALTCHOICE

## 2018-06-05 NOTE — TELEPHONE ENCOUNTER
Medication(s) to Refill:   Pending Prescriptions Disp Refills    RANITIDINE  MG Oral Cap [Pharmacy Med Name: RANITIDINE 150MG CAPSULES] 60 capsule 0     Sig: TAKE 1 CAPSULE(150 MG) BY MOUTH TWICE DAILY         Please approve or deny, should patient

## 2018-08-02 RX ORDER — OMEPRAZOLE 40 MG/1
CAPSULE, DELAYED RELEASE ORAL
Qty: 30 CAPSULE | Refills: 0 | Status: SHIPPED | OUTPATIENT
Start: 2018-08-02 | End: 2018-09-06

## 2018-09-06 RX ORDER — OMEPRAZOLE 40 MG/1
CAPSULE, DELAYED RELEASE ORAL
Qty: 30 CAPSULE | Refills: 0 | Status: SHIPPED | OUTPATIENT
Start: 2018-09-06 | End: 2018-10-21

## 2018-10-15 ENCOUNTER — OFFICE VISIT (OUTPATIENT)
Dept: FAMILY MEDICINE CLINIC | Facility: CLINIC | Age: 43
End: 2018-10-15
Payer: COMMERCIAL

## 2018-10-15 VITALS
RESPIRATION RATE: 18 BRPM | BODY MASS INDEX: 45.99 KG/M2 | TEMPERATURE: 98 F | HEART RATE: 76 BPM | SYSTOLIC BLOOD PRESSURE: 112 MMHG | WEIGHT: 293 LBS | OXYGEN SATURATION: 98 % | DIASTOLIC BLOOD PRESSURE: 82 MMHG | HEIGHT: 67 IN

## 2018-10-15 DIAGNOSIS — Z00.00 ENCOUNTER FOR ANNUAL PHYSICAL EXAMINATION EXCLUDING GYNECOLOGICAL EXAMINATION IN A PATIENT OLDER THAN 17 YEARS: Primary | ICD-10-CM

## 2018-10-15 DIAGNOSIS — F10.10 ALCOHOL ABUSE, EPISODIC: ICD-10-CM

## 2018-10-15 DIAGNOSIS — Z12.39 SCREENING FOR BREAST CANCER: ICD-10-CM

## 2018-10-15 DIAGNOSIS — E66.01 MORBID OBESITY WITH BMI OF 40.0-44.9, ADULT (HCC): ICD-10-CM

## 2018-10-15 DIAGNOSIS — Z13.29 SCREENING FOR ENDOCRINE, NUTRITIONAL, METABOLIC AND IMMUNITY DISORDER: ICD-10-CM

## 2018-10-15 DIAGNOSIS — F31.81 BIPOLAR 2 DISORDER (HCC): ICD-10-CM

## 2018-10-15 DIAGNOSIS — Z23 NEED FOR VACCINATION: ICD-10-CM

## 2018-10-15 DIAGNOSIS — Z13.0 SCREENING FOR ENDOCRINE, NUTRITIONAL, METABOLIC AND IMMUNITY DISORDER: ICD-10-CM

## 2018-10-15 DIAGNOSIS — Z23 NEED FOR TDAP VACCINATION: ICD-10-CM

## 2018-10-15 DIAGNOSIS — Z13.21 SCREENING FOR ENDOCRINE, NUTRITIONAL, METABOLIC AND IMMUNITY DISORDER: ICD-10-CM

## 2018-10-15 DIAGNOSIS — Z13.228 SCREENING FOR ENDOCRINE, NUTRITIONAL, METABOLIC AND IMMUNITY DISORDER: ICD-10-CM

## 2018-10-15 PROCEDURE — 90472 IMMUNIZATION ADMIN EACH ADD: CPT | Performed by: EMERGENCY MEDICINE

## 2018-10-15 PROCEDURE — 90715 TDAP VACCINE 7 YRS/> IM: CPT | Performed by: EMERGENCY MEDICINE

## 2018-10-15 PROCEDURE — 90471 IMMUNIZATION ADMIN: CPT | Performed by: EMERGENCY MEDICINE

## 2018-10-15 PROCEDURE — 99396 PREV VISIT EST AGE 40-64: CPT | Performed by: EMERGENCY MEDICINE

## 2018-10-15 PROCEDURE — 90686 IIV4 VACC NO PRSV 0.5 ML IM: CPT | Performed by: EMERGENCY MEDICINE

## 2018-10-15 NOTE — PATIENT INSTRUCTIONS
Thank you for choosing [facility]  To Do:  [address]    · Continue follow up with psychaitrist  · Try to establish counseling  · Continue to remain sober  · Continue with weight loss  · Overall decreased caloric intake in order to pr per serving   Source   Calcium (mg) per serving      Low-fat yogurt, plain   415 mg/8 oz.   Sardines, Atlantic, canned, with bones   351 mg/3 oz.   Oatmeal, instant, fortified   215 mg/1 cup   Nonfat milk   302 mg/1 cup   [place], sockeye, canned, with bone pressure All women in this age group Every 2 years if your blood pressure is less than 120/80 mm Hg; yearly if your systolic blood pressure is 120 to 139 mm Hg, or your diastolic blood pressure reading is 80 to 89 mm Hg   Breast cancer All women in this ag healthcare provider 2 doses given 6 months apart   Hepatitis B Women at increased risk for infection – talk with your healthcare provider 3 doses over 6 months; second dose should be given 1 month after the first dose; the third dose should be given at [place] [address] [facility], [address]. All rights reserved. This information is not intended as a substitute for professional medical care. Always follow your healthcare professional's instructions.

## 2018-10-15 NOTE — PROGRESS NOTES
Xenia Osler is a 40year old female who presents for a complete physical exam.   HPI:     Patient presents with:  Physical: Annual physical         Age: 40    1First day of last menstrual period (or first year of         menstruation, if throu years old, have you  had your cholesterol level checked  in the past five years? YES       e. Have you had a tetanus shot  the past 10 years? UNK       f. Does your house have a working smoke detector? YES        g. Do you have firearms at home?     NO Cancer Maternal Cousin Female 36        early 42's    • Breast Cancer Maternal Cousin Female 36        early 42's    • Depression Mother    • Bipolar Disorder Mother    • Other (Emphysema) Father    • OCD Daughter    • Bipolar Disorder Daughter    • Depres Daughter    • Depression Sister    • Substance Abuse Brother    • Depression Sister       Social History:   Social History    Tobacco Use      Smoking status: Current Some Day Smoker        Packs/day: 0.50        Years: 0.25        Pack years: .125      Sm murmurs,clicks, gallops  Breast:No palpable masses, no axillary LAD, no nipple D/C, drainage or retractions. Abdomen: is soft,NBS, NT/ND with no HSM. No rebound or guarding. No CVA tenderness, no hernias.    exam: Speculum placed and vaginal wall visual depression/anxiety issues, or any further concerns. Bipolar 2 disorder (HCC)   Currently stable. Reports no mood swings. Currently on no medication.   Patient still encouraged to closely follow-up with psychiatry once or twice a year for continued

## 2018-10-16 ENCOUNTER — LAB ENCOUNTER (OUTPATIENT)
Dept: LAB | Age: 43
End: 2018-10-16
Attending: EMERGENCY MEDICINE
Payer: COMMERCIAL

## 2018-10-16 DIAGNOSIS — Z13.21 SCREENING FOR ENDOCRINE, NUTRITIONAL, METABOLIC AND IMMUNITY DISORDER: ICD-10-CM

## 2018-10-16 DIAGNOSIS — Z13.29 SCREENING FOR ENDOCRINE, NUTRITIONAL, METABOLIC AND IMMUNITY DISORDER: ICD-10-CM

## 2018-10-16 DIAGNOSIS — Z13.0 SCREENING FOR ENDOCRINE, NUTRITIONAL, METABOLIC AND IMMUNITY DISORDER: ICD-10-CM

## 2018-10-16 DIAGNOSIS — K21.9 GASTROESOPHAGEAL REFLUX DISEASE, ESOPHAGITIS PRESENCE NOT SPECIFIED: ICD-10-CM

## 2018-10-16 DIAGNOSIS — Z13.228 SCREENING FOR ENDOCRINE, NUTRITIONAL, METABOLIC AND IMMUNITY DISORDER: ICD-10-CM

## 2018-10-16 PROCEDURE — 85025 COMPLETE CBC W/AUTO DIFF WBC: CPT | Performed by: EMERGENCY MEDICINE

## 2018-10-16 PROCEDURE — 83735 ASSAY OF MAGNESIUM: CPT | Performed by: PHYSICIAN ASSISTANT

## 2018-10-16 PROCEDURE — 36415 COLL VENOUS BLD VENIPUNCTURE: CPT | Performed by: EMERGENCY MEDICINE

## 2018-10-16 PROCEDURE — 84443 ASSAY THYROID STIM HORMONE: CPT | Performed by: EMERGENCY MEDICINE

## 2018-10-16 PROCEDURE — 80061 LIPID PANEL: CPT | Performed by: EMERGENCY MEDICINE

## 2018-10-16 PROCEDURE — 80053 COMPREHEN METABOLIC PANEL: CPT | Performed by: PHYSICIAN ASSISTANT

## 2018-10-22 NOTE — TELEPHONE ENCOUNTER
Medication(s) to Refill:   Requested Prescriptions     Pending Prescriptions Disp Refills   • OMEPRAZOLE 40 MG Oral Capsule Delayed Release [Pharmacy Med Name: OMEPRAZOLE 40MG CAPSULES] 30 capsule 0     Sig: TAKE 1 CAPSULE(40 MG) BY MOUTH EVERY DAY

## 2018-10-23 RX ORDER — OMEPRAZOLE 40 MG/1
CAPSULE, DELAYED RELEASE ORAL
Qty: 30 CAPSULE | Refills: 0 | Status: SHIPPED | OUTPATIENT
Start: 2018-10-23 | End: 2018-11-16

## 2018-11-02 ENCOUNTER — HOSPITAL ENCOUNTER (OUTPATIENT)
Dept: MAMMOGRAPHY | Age: 43
Discharge: HOME | End: 2018-11-02
Attending: EMERGENCY MEDICINE
Payer: COMMERCIAL

## 2018-11-02 DIAGNOSIS — Z12.39 SCREENING FOR BREAST CANCER: ICD-10-CM

## 2018-11-02 PROCEDURE — 77063 BREAST TOMOSYNTHESIS BI: CPT | Performed by: EMERGENCY MEDICINE

## 2018-11-02 PROCEDURE — 77067 SCR MAMMO BI INCL CAD: CPT | Performed by: EMERGENCY MEDICINE

## 2018-11-16 RX ORDER — OMEPRAZOLE 40 MG/1
CAPSULE, DELAYED RELEASE ORAL
Qty: 30 CAPSULE | Refills: 0 | Status: SHIPPED | OUTPATIENT
Start: 2018-11-16 | End: 2018-12-13

## 2018-12-13 RX ORDER — OMEPRAZOLE 40 MG/1
CAPSULE, DELAYED RELEASE ORAL
Qty: 30 CAPSULE | Refills: 0 | Status: SHIPPED | OUTPATIENT
Start: 2018-12-13 | End: 2019-01-09

## 2019-01-07 NOTE — TELEPHONE ENCOUNTER
LOV   & LF   4/2017  Please approve or deny Rx refill request.   Thank you. Pt informed that her rapid flu was neg in ofc today

## 2019-01-09 RX ORDER — OMEPRAZOLE 40 MG/1
CAPSULE, DELAYED RELEASE ORAL
Qty: 30 CAPSULE | Refills: 0 | Status: SHIPPED | OUTPATIENT
Start: 2019-01-09 | End: 2019-02-06

## 2019-02-06 RX ORDER — OMEPRAZOLE 40 MG/1
CAPSULE, DELAYED RELEASE ORAL
Qty: 30 CAPSULE | Refills: 0 | Status: SHIPPED | OUTPATIENT
Start: 2019-02-06 | End: 2019-03-14

## 2019-02-18 ENCOUNTER — TELEPHONE (OUTPATIENT)
Dept: FAMILY MEDICINE CLINIC | Facility: CLINIC | Age: 44
End: 2019-02-18

## 2019-02-18 NOTE — TELEPHONE ENCOUNTER
Fax received from [employer]. Patient's pharmacy coverage has . I spoke to [facility]. PA needs to be done through [facility]. Number is [phone]. ID is [id number].

## 2019-02-18 NOTE — TELEPHONE ENCOUNTER
PA completed on CMM and approved by [employer] [facility] from 1/19/19 through 2/17/2022.  Message left for [employer].

## 2019-02-18 NOTE — TELEPHONE ENCOUNTER
Patient Kunal Crystal calling for status of PA for OMEPRAZOLE 40 MG Oral Capsule Delayed Release  Script originally sent to [employer], insurance requires that [facility] be used.   Script transferred to [facility], [facility] told patient that they sent notice to provider that DARRYL coleman

## 2019-03-01 ENCOUNTER — OFFICE VISIT (OUTPATIENT)
Dept: FAMILY MEDICINE CLINIC | Facility: CLINIC | Age: 44
End: 2019-03-01
Payer: COMMERCIAL

## 2019-03-01 VITALS
WEIGHT: 293 LBS | HEART RATE: 90 BPM | OXYGEN SATURATION: 98 % | DIASTOLIC BLOOD PRESSURE: 78 MMHG | SYSTOLIC BLOOD PRESSURE: 118 MMHG | HEIGHT: 67 IN | RESPIRATION RATE: 18 BRPM | BODY MASS INDEX: 45.99 KG/M2 | TEMPERATURE: 98 F

## 2019-03-01 DIAGNOSIS — R05.9 COUGH: ICD-10-CM

## 2019-03-01 DIAGNOSIS — J01.40 ACUTE PANSINUSITIS, RECURRENCE NOT SPECIFIED: Primary | ICD-10-CM

## 2019-03-01 PROCEDURE — 99213 OFFICE O/P EST LOW 20 MIN: CPT | Performed by: NURSE PRACTITIONER

## 2019-03-01 PROCEDURE — 94640 AIRWAY INHALATION TREATMENT: CPT | Performed by: NURSE PRACTITIONER

## 2019-03-01 RX ORDER — ALBUTEROL SULFATE 2.5 MG/3ML
2.5 SOLUTION RESPIRATORY (INHALATION) ONCE
Status: COMPLETED | OUTPATIENT
Start: 2019-03-01 | End: 2019-03-01

## 2019-03-01 RX ORDER — AMOXICILLIN AND CLAVULANATE POTASSIUM 875; 125 MG/1; MG/1
1 TABLET, FILM COATED ORAL 2 TIMES DAILY
Qty: 20 TABLET | Refills: 0 | Status: SHIPPED | OUTPATIENT
Start: 2019-03-01 | End: 2019-03-11

## 2019-03-01 RX ADMIN — ALBUTEROL SULFATE 2.5 MG: 2.5 SOLUTION RESPIRATORY (INHALATION) at 10:20:00

## 2019-03-01 NOTE — PROGRESS NOTES
CHIEF COMPLAINT:   Patient presents with:  Cough: nasal congestion, sinus pressure, tired x 1 week      HPI:   Xenia Osler is a 44year old female who presents for cold symptoms for  1  weeks.  Symptoms have progressed into sinus congestion and been Smoking status: Current Some Day Smoker        Packs/day: 0.50        Years: 0.25        Pack years: .125      Smokeless tobacco: Never Used    Alcohol use: No      Alcohol/week: 7.8 oz      Types: 6 Cans of beer, 7 Shots of liquor per week      Commen Acute pansinusitis, recurrence not specified  (primary encounter diagnosis)  Cough      PLAN: Meds as below.     Comfort care instructions as listed in Patient Instructions  Albuterol nebulizer in clinic today    Meds & Refills for this Visit:  Requested Pr Your doctor may prescribe medications to help treat your sinusitis. If you have an infection, antibiotics can help clear it up. If you are prescribed antibiotics, take all pills on schedule until they are gone, even if you feel better.  Decongestants help r

## 2019-03-14 RX ORDER — OMEPRAZOLE 40 MG/1
CAPSULE, DELAYED RELEASE ORAL
Qty: 90 CAPSULE | Refills: 0 | Status: SHIPPED | OUTPATIENT
Start: 2019-03-14 | End: 2019-06-10

## 2019-03-14 NOTE — TELEPHONE ENCOUNTER
Medication(s) to Refill:   Requested Prescriptions     Pending Prescriptions Disp Refills   • Omeprazole 40 MG Oral Capsule Delayed Release 90 capsule 0     Sig: TAKE 1 CAPSULE(40 MG) BY MOUTH EVERY DAY         Reason for Medication Refill being sent to Pr

## 2019-06-10 RX ORDER — OMEPRAZOLE 40 MG/1
CAPSULE, DELAYED RELEASE ORAL
Qty: 90 CAPSULE | Refills: 0 | Status: SHIPPED | OUTPATIENT
Start: 2019-06-10 | End: 2019-09-09

## 2019-07-25 ENCOUNTER — OFFICE VISIT (OUTPATIENT)
Dept: FAMILY MEDICINE CLINIC | Facility: CLINIC | Age: 44
End: 2019-07-25
Payer: COMMERCIAL

## 2019-07-25 VITALS
HEIGHT: 67 IN | HEART RATE: 98 BPM | RESPIRATION RATE: 24 BRPM | SYSTOLIC BLOOD PRESSURE: 118 MMHG | TEMPERATURE: 100 F | BODY MASS INDEX: 45.99 KG/M2 | OXYGEN SATURATION: 98 % | WEIGHT: 293 LBS | DIASTOLIC BLOOD PRESSURE: 72 MMHG

## 2019-07-25 DIAGNOSIS — R50.9 LOW GRADE FEVER: ICD-10-CM

## 2019-07-25 DIAGNOSIS — B34.9 VIRAL ILLNESS: Primary | ICD-10-CM

## 2019-07-25 LAB
BILIRUBIN: NEGATIVE
GLUCOSE (URINE DIPSTICK): NEGATIVE MG/DL
KETONES (URINE DIPSTICK): NEGATIVE MG/DL
MULTISTIX LOT#: NORMAL NUMERIC
NITRITE, URINE: NEGATIVE
PH, URINE: 7 (ref 4.5–8)
PROTEIN (URINE DIPSTICK): NEGATIVE MG/DL
SPECIFIC GRAVITY: 1.01 (ref 1–1.03)
URINE-COLOR: YELLOW
UROBILINOGEN,SEMI-QN: 0.2 MG/DL (ref 0–1.9)

## 2019-07-25 PROCEDURE — 81003 URINALYSIS AUTO W/O SCOPE: CPT | Performed by: NURSE PRACTITIONER

## 2019-07-25 PROCEDURE — 99213 OFFICE O/P EST LOW 20 MIN: CPT | Performed by: NURSE PRACTITIONER

## 2019-07-25 PROCEDURE — 87086 URINE CULTURE/COLONY COUNT: CPT | Performed by: NURSE PRACTITIONER

## 2019-07-25 NOTE — PATIENT INSTRUCTIONS
Rest   Push fluids  Follow up Monday if symptoms persist    Viral Syndrome (Adult)  A viral illness may cause a number of symptoms such as fever. Other symptoms depend on the part of the body that the virus affects.  If it settles in your nose, throat, an · Your appetite may be poor, so a light diet is fine. Avoid dehydration by drinking 8 to 12, 8-ounce glasses of fluids each day.  This may include water; orange juice; lemonade; apple, grape, and cranberry juice; clear fruit drinks; electrolyte replacement © 9553-2306 [employer] [address]. [address] [facility], [facility] [place]. All rights reserved. This information is not intended as a substitute for professional medical care. Always follow your healthcare professional's instructions.

## 2019-07-25 NOTE — PROGRESS NOTES
CHIEF COMPLAINT:   Patient presents with:  Fever: 99.8 (today), feeling weak, chills, loss of appetite, X 4 days only at night       HPI:   Wenceslao Current is a 44year old female who presents for low grade fever for  4  days Reports Tmax of 100.   Treat Alcohol/week: 13.0 standard drinks      Types: 6 Cans of beer, 7 Shots of liquor per week      Comment: 3 or more episodes / week, unable to control the amount and length of time using    Drug use: No      Types: Cannabis, Cocaine      Comment: used co Spec Gravity 1.010 1.005 - 1.030    Blood Urine Trace Negative    PH Urine 7.0 4.5 - 8.0    Protein Urine Negative Negative/Trace mg/dL    Urobilinogen Urine 0.2 0.0 - 1.9 mg/dL    Nitrite Urine Negative Negative    Leukocyte Esterase Urine Trace Negative · If symptoms are severe, rest at home for the first 2 to 3 days. · Stay away from cigarette smoke - both your smoke and the smoke from others.   · You may use over-the-counter acetaminophen or ibuprofen for fever, muscle aching, and headache, unless anoth · Severe, constant pain in the lower right side of your belly (abdominal)  · Continued vomiting (can’t keep liquids down)  · Frequent diarrhea (more than 5 times a day); blood (red or black color) or mucus in diarrhea  · Feeling weak, dizzy, or like you ar

## 2019-07-28 ENCOUNTER — HOSPITAL ENCOUNTER (EMERGENCY)
Age: 44
Discharge: HOME | End: 2019-07-28
Attending: EMERGENCY MEDICINE

## 2019-07-28 VITALS
HEIGHT: 67 IN | WEIGHT: 293 LBS | RESPIRATION RATE: 18 BRPM | BODY MASS INDEX: 45.99 KG/M2 | OXYGEN SATURATION: 98 % | TEMPERATURE: 98 F | HEART RATE: 92 BPM | DIASTOLIC BLOOD PRESSURE: 54 MMHG | SYSTOLIC BLOOD PRESSURE: 113 MMHG

## 2019-07-28 DIAGNOSIS — N30.00 ACUTE CYSTITIS WITHOUT HEMATURIA: Primary | ICD-10-CM

## 2019-07-28 LAB
BILIRUB UR QL STRIP.AUTO: NEGATIVE
COLOR UR AUTO: YELLOW
GLUCOSE UR STRIP.AUTO-MCNC: NEGATIVE MG/DL
KETONES UR STRIP.AUTO-MCNC: NEGATIVE MG/DL
NITRITE UR QL STRIP.AUTO: NEGATIVE
PH UR STRIP.AUTO: 5 [PH] (ref 4.5–8)
POCT LOT NUMBER: NORMAL
POCT URINE PREGNANCY: NEGATIVE
SP GR UR STRIP.AUTO: 1.02 (ref 1–1.03)
UROBILINOGEN UR STRIP.AUTO-MCNC: 0.2 MG/DL

## 2019-07-28 PROCEDURE — 87086 URINE CULTURE/COLONY COUNT: CPT | Performed by: EMERGENCY MEDICINE

## 2019-07-28 PROCEDURE — 99283 EMERGENCY DEPT VISIT LOW MDM: CPT

## 2019-07-28 PROCEDURE — 81025 URINE PREGNANCY TEST: CPT

## 2019-07-28 PROCEDURE — 81001 URINALYSIS AUTO W/SCOPE: CPT | Performed by: EMERGENCY MEDICINE

## 2019-07-28 RX ORDER — SULFAMETHOXAZOLE AND TRIMETHOPRIM 800; 160 MG/1; MG/1
1 TABLET ORAL 2 TIMES DAILY
Qty: 14 TABLET | Refills: 0 | Status: SHIPPED | OUTPATIENT
Start: 2019-07-28 | End: 2019-08-04

## 2019-07-29 NOTE — ED PROVIDER NOTES
Patient Seen in: [facility] [place]    History   Patient presents with:  Back Pain (musculoskeletal)  Fever (infectious)    Stated Complaint: mid and low back pain today, also c/o fatigue, fever x 3 days    HPI    42-year-old femal using    Drug use: No      Types: Cannabis, Cocaine      Comment: used cocain ta few times in past month.  Cannabis 4 times a year      Review of Systems    Positive for stated complaint: mid and low back pain today, also c/o fatigue, fever x 3 days  Other for her urine tract infection follow-up    MDM   As above              Disposition and Plan     Clinical Impression:  Acute cystitis without hematuria  (primary encounter diagnosis)    Disposition:  Discharge  7/28/2019  9:36 pm    Follow-up:  Cisco Sutton Ph

## 2019-09-09 RX ORDER — OMEPRAZOLE 40 MG/1
CAPSULE, DELAYED RELEASE ORAL
Qty: 90 CAPSULE | Refills: 0 | Status: SHIPPED | OUTPATIENT
Start: 2019-09-09 | End: 2019-12-03

## 2019-10-17 ENCOUNTER — OFFICE VISIT (OUTPATIENT)
Dept: FAMILY MEDICINE CLINIC | Facility: CLINIC | Age: 44
End: 2019-10-17
Payer: COMMERCIAL

## 2019-10-17 ENCOUNTER — LAB ENCOUNTER (OUTPATIENT)
Dept: LAB | Age: 44
End: 2019-10-17
Attending: EMERGENCY MEDICINE
Payer: COMMERCIAL

## 2019-10-17 VITALS
SYSTOLIC BLOOD PRESSURE: 114 MMHG | HEART RATE: 85 BPM | HEIGHT: 67 IN | DIASTOLIC BLOOD PRESSURE: 78 MMHG | OXYGEN SATURATION: 96 % | RESPIRATION RATE: 17 BRPM | BODY MASS INDEX: 45.99 KG/M2 | TEMPERATURE: 99 F | WEIGHT: 293 LBS

## 2019-10-17 DIAGNOSIS — Z13.29 SCREENING FOR ENDOCRINE, NUTRITIONAL, METABOLIC AND IMMUNITY DISORDER: ICD-10-CM

## 2019-10-17 DIAGNOSIS — Z13.0 SCREENING FOR ENDOCRINE, NUTRITIONAL, METABOLIC AND IMMUNITY DISORDER: ICD-10-CM

## 2019-10-17 DIAGNOSIS — Z13.228 SCREENING FOR ENDOCRINE, NUTRITIONAL, METABOLIC AND IMMUNITY DISORDER: ICD-10-CM

## 2019-10-17 DIAGNOSIS — E66.01 MORBID OBESITY WITH BMI OF 40.0-44.9, ADULT (HCC): ICD-10-CM

## 2019-10-17 DIAGNOSIS — Z13.21 SCREENING FOR ENDOCRINE, NUTRITIONAL, METABOLIC AND IMMUNITY DISORDER: ICD-10-CM

## 2019-10-17 DIAGNOSIS — Z23 NEED FOR TUBERCULOSIS VACCINATION: ICD-10-CM

## 2019-10-17 DIAGNOSIS — Z00.00 ENCOUNTER FOR ANNUAL PHYSICAL EXAM: Primary | ICD-10-CM

## 2019-10-17 DIAGNOSIS — Z23 NEED FOR VACCINATION: ICD-10-CM

## 2019-10-17 DIAGNOSIS — Z12.39 SCREENING FOR BREAST CANCER: ICD-10-CM

## 2019-10-17 PROBLEM — J98.01 BRONCHOSPASM: Status: RESOLVED | Noted: 2017-04-13 | Resolved: 2019-10-17

## 2019-10-17 PROBLEM — J20.9 BRONCHOSPASM WITH BRONCHITIS, ACUTE: Status: RESOLVED | Noted: 2017-04-17 | Resolved: 2019-10-17

## 2019-10-17 PROCEDURE — 86580 TB INTRADERMAL TEST: CPT | Performed by: EMERGENCY MEDICINE

## 2019-10-17 PROCEDURE — 90686 IIV4 VACC NO PRSV 0.5 ML IM: CPT | Performed by: EMERGENCY MEDICINE

## 2019-10-17 PROCEDURE — 80050 GENERAL HEALTH PANEL: CPT | Performed by: EMERGENCY MEDICINE

## 2019-10-17 PROCEDURE — 99396 PREV VISIT EST AGE 40-64: CPT | Performed by: EMERGENCY MEDICINE

## 2019-10-17 PROCEDURE — 83036 HEMOGLOBIN GLYCOSYLATED A1C: CPT | Performed by: EMERGENCY MEDICINE

## 2019-10-17 PROCEDURE — 90471 IMMUNIZATION ADMIN: CPT | Performed by: EMERGENCY MEDICINE

## 2019-10-17 PROCEDURE — 80061 LIPID PANEL: CPT | Performed by: EMERGENCY MEDICINE

## 2019-10-17 PROCEDURE — 36415 COLL VENOUS BLD VENIPUNCTURE: CPT | Performed by: EMERGENCY MEDICINE

## 2019-10-17 NOTE — PROGRESS NOTES
Alfredito Roberson is a 44year old female who presents for a complete physical exam.   HPI:     Patient presents with:  Physical: Annual physical             Age: 44    1First day of last menstrual period (or first year of         menstruation, if throu c. Do you always wear seat belts? YES       d. If over 27years old, have you  had your cholesterol level checked  in the past five years? YES       e. Have you had a tetanus shot  the past 10 years?  2018 PSG-17    AHI 12.5 REM AHI 47 Supine AHI 16.5  83%   • Visual impairment       Past Surgical History:   Procedure Laterality Date   • APPENDECTOMY     • APPENDECTOMY     •       2 c-sections   • ALTAF, IUD, 5 YEAR  2017      Family Breast Cancer Maternal Cousin Female 36        early 42's    • Depression Mother    • Bipolar Disorder Mother    • Other (Emphysema) Father    • OCD Daughter    • Bipolar Disorder Daughter    • Depression Sister    • Substance Abuse Brother    • Depression canals bilaterally. Neck is supple, with no cervical LAD or thyroid abnormalities. No carotid bruits. Lungs: are clear to auscultation bilaterally, with no wheeze, rhonchi, or rales. Heart: is RRR.   S1, S2, with no murmurs,clicks, gallops  Breast:  n to date. Self breast exams advised. The patient should schedule annual mammograms beginning at the age of 36. Counseled on fat diet and aerobic exercise 30 minutes three times weekly.    Counseled on maintaining a healthy weight and healthy BMI  Health

## 2019-10-17 NOTE — PATIENT INSTRUCTIONS
Thank you for choosing [facility]  To Do:   [address]    · Consider weight loss clinic,  · Follow up with dietician for nutritional guidance  · Overall decreased caloric intake in order to promote weight loss.   Eat 5602-1213 eliezer per d Calcium (mg) per serving   Source   Calcium (mg) per serving      Low-fat yogurt, plain   415 mg/8 oz.   Sardines, Atlantic, canned, with bones   351 mg/3 oz.   Oatmeal, instant, fortified   215 mg/1 cup   Nonfat milk   302 mg/1 cup   [place], sockeye, mario exams   Blood pressure All women in this age group Every 2 years if your blood pressure is less than 120/80 mm Hg; yearly if your systolic blood pressure is 120 to 139 mm Hg, or your diastolic blood pressure reading is 80 to 89 mm Hg   Breast cancer All wo with your healthcare provider 2 doses given 6 months apart   Hepatitis B Women at increased risk for infection – talk with your healthcare provider 3 doses over 6 months; second dose should be given 1 month after the first dose; the third dose should be gi [facility]. [address] [facility], [address] [place]. All rights reserved. This information is not intended as a substitute for professional medical care. Always follow your healthcare professional's instructions.

## 2019-10-18 ENCOUNTER — TELEPHONE (OUTPATIENT)
Dept: FAMILY MEDICINE CLINIC | Facility: CLINIC | Age: 44
End: 2019-10-18

## 2019-10-18 NOTE — TELEPHONE ENCOUNTER
Pt came in for an appt and dropped off a work physical form. Form is pending a TB read and was placed in Dr. Alfred Bustillo pending folder.

## 2019-10-19 ENCOUNTER — OFFICE VISIT (OUTPATIENT)
Dept: FAMILY MEDICINE CLINIC | Facility: CLINIC | Age: 44
End: 2019-10-19
Payer: COMMERCIAL

## 2019-10-19 DIAGNOSIS — Z23 NEED FOR TUBERCULOSIS VACCINATION: Primary | ICD-10-CM

## 2019-11-09 ENCOUNTER — HOSPITAL ENCOUNTER (OUTPATIENT)
Dept: MAMMOGRAPHY | Age: 44
Discharge: HOME | End: 2019-11-09
Attending: EMERGENCY MEDICINE
Payer: COMMERCIAL

## 2019-11-09 DIAGNOSIS — Z12.39 SCREENING FOR BREAST CANCER: ICD-10-CM

## 2019-11-09 PROCEDURE — 77067 SCR MAMMO BI INCL CAD: CPT | Performed by: EMERGENCY MEDICINE

## 2019-11-09 PROCEDURE — 77063 BREAST TOMOSYNTHESIS BI: CPT | Performed by: EMERGENCY MEDICINE

## 2019-12-03 NOTE — TELEPHONE ENCOUNTER
Medication(s) to Refill:   Requested Prescriptions     Pending Prescriptions Disp Refills   • OMEPRAZOLE 40 MG Oral Capsule Delayed Release [Pharmacy Med Name: OMEPRAZOLE DR 40 MG CAPSULE] 90 capsule 0     Sig: TAKE 1 CAPSULE(40 MG) BY MOUTH EVERY DAY

## 2019-12-06 RX ORDER — OMEPRAZOLE 40 MG/1
CAPSULE, DELAYED RELEASE ORAL
Qty: 90 CAPSULE | Refills: 0 | Status: SHIPPED | OUTPATIENT
Start: 2019-12-06 | End: 2020-03-16

## 2020-03-02 ENCOUNTER — HOSPITAL ENCOUNTER (EMERGENCY)
Age: 45
Discharge: HOME | End: 2020-03-02
Attending: EMERGENCY MEDICINE
Payer: COMMERCIAL

## 2020-03-02 VITALS
RESPIRATION RATE: 18 BRPM | TEMPERATURE: 98 F | BODY MASS INDEX: 45.45 KG/M2 | SYSTOLIC BLOOD PRESSURE: 120 MMHG | HEART RATE: 81 BPM | HEIGHT: 67.5 IN | OXYGEN SATURATION: 97 % | WEIGHT: 293 LBS | DIASTOLIC BLOOD PRESSURE: 46 MMHG

## 2020-03-02 DIAGNOSIS — F07.81 CONCUSSION SYNDROME: ICD-10-CM

## 2020-03-02 DIAGNOSIS — S09.90XA INJURY OF HEAD, INITIAL ENCOUNTER: Primary | ICD-10-CM

## 2020-03-02 PROCEDURE — 99283 EMERGENCY DEPT VISIT LOW MDM: CPT

## 2020-03-02 PROCEDURE — 99284 EMERGENCY DEPT VISIT MOD MDM: CPT

## 2020-03-02 NOTE — ED PROVIDER NOTES
I reviewed that chart and discussed the case. I have examined the patient and noted pupils are Marv. EOMI. Cranial nerves II through XII intact bilaterally. Speech is normal.  Intact strength and sensation bilateral upper and lower extremities.   No pro

## 2020-03-02 NOTE — ED PROVIDER NOTES
Patient Seen in: [facility] Emergency Department In [place]      History   Patient presents with:  Headache    Stated Complaint: was hit on the head by a volleyball while sitting at the bleacher last night.     HPI    CHIEF COMPLAINT: Headache     HISTORY • Anxiety    • Bipolar affective ([facility] [address]    • Depression    • Esophageal reflux    • Human papilloma virus infection 2009   • Obesity    • YING (obstructive sleep apnea) EDW PSG-6/1/17    AHI 12.5 REM AHI 47 Supine AHI 16.5  83%   • Visual impairment well. Mucous membranes moist.  Respiratory: there are no retractions, lungs are clear to auscultation  Cardiovascular: regular rate and rhythm  Gastrointestinal:  abdomen is soft and non tender, no masses, bowel sounds normal.  No rebound, guarding or rigi

## 2020-03-02 NOTE — ED INITIAL ASSESSMENT (HPI)
Patient states she was struck in the head by a volleyball yesterday  Denies LOC   Today c/o HA, nausea, dizziness   HX TBI 16 years ago

## 2020-03-16 RX ORDER — OMEPRAZOLE 40 MG/1
CAPSULE, DELAYED RELEASE ORAL
Qty: 90 CAPSULE | Refills: 0 | Status: SHIPPED | OUTPATIENT
Start: 2020-03-16 | End: 2020-06-09

## 2020-04-07 ENCOUNTER — TELEPHONE (OUTPATIENT)
Dept: FAMILY MEDICINE CLINIC | Facility: CLINIC | Age: 45
End: 2020-04-07

## 2020-04-07 NOTE — TELEPHONE ENCOUNTER
Patient called she has a question about Lorazepam. It has been years since she has taken it and she is having severe anxiety and wondering if you can give her a prescription for it. Patient was in tears explaining her anxiety.

## 2020-04-07 NOTE — PROGRESS NOTES
Virtual/Telephone Check-In    Efrain Og verbally consents to a Virtual/Telephone Check-In service on 04/07/20.   Patient understands and accepts financial responsibility for any deductible, co-insurance and/or co-pays associated with this service

## 2020-04-07 NOTE — TELEPHONE ENCOUNTER
Please assist patient in scheduling appointment. If no other symptoms Dr. Gill Blizzard is in office today otherwise, please schedule TE visit. Thank you!

## 2020-06-09 RX ORDER — OMEPRAZOLE 40 MG/1
CAPSULE, DELAYED RELEASE ORAL
Qty: 90 CAPSULE | Refills: 0 | Status: SHIPPED | OUTPATIENT
Start: 2020-06-09 | End: 2020-09-05

## 2020-09-05 RX ORDER — OMEPRAZOLE 40 MG/1
CAPSULE, DELAYED RELEASE ORAL
Qty: 90 CAPSULE | Refills: 0 | Status: SHIPPED | OUTPATIENT
Start: 2020-09-05 | End: 2020-12-01

## 2020-12-01 RX ORDER — OMEPRAZOLE 40 MG/1
CAPSULE, DELAYED RELEASE ORAL
Qty: 90 CAPSULE | Refills: 0 | Status: SHIPPED | OUTPATIENT
Start: 2020-12-01 | End: 2021-02-23

## 2021-02-22 NOTE — TELEPHONE ENCOUNTER
Medication(s) to Refill:   Requested Prescriptions     Pending Prescriptions Disp Refills   • OMEPRAZOLE 40 MG Oral Capsule Delayed Release [Pharmacy Med Name: OMEPRAZOLE DR 40 MG CAPSULE] 90 capsule 0     Sig: TAKE 1 CAPSULE BY MOUTH EVERY DAY         Wolfgang Nayak

## 2021-02-23 RX ORDER — OMEPRAZOLE 40 MG/1
CAPSULE, DELAYED RELEASE ORAL
Qty: 90 CAPSULE | Refills: 0 | Status: SHIPPED | OUTPATIENT
Start: 2021-02-23 | End: 2021-10-12

## 2021-10-12 RX ORDER — OMEPRAZOLE 40 MG/1
CAPSULE, DELAYED RELEASE ORAL
Qty: 90 CAPSULE | Refills: 0 | Status: SHIPPED | OUTPATIENT
Start: 2021-10-12 | End: 2022-01-06

## 2021-10-12 NOTE — TELEPHONE ENCOUNTER
Medication(s) to Refill:   Requested Prescriptions     Pending Prescriptions Disp Refills   • OMEPRAZOLE 40 MG Oral Capsule Delayed Release [Pharmacy Med Name: OMEPRAZOLE DR 40 MG CAPSULE] 90 capsule 0     Sig: TAKE 1 CAPSULE BY MOUTH EVERY DAY         Marcio Pearson

## 2022-01-06 RX ORDER — OMEPRAZOLE 40 MG/1
CAPSULE, DELAYED RELEASE ORAL
Qty: 90 CAPSULE | Refills: 0 | Status: SHIPPED | OUTPATIENT
Start: 2022-01-06

## 2022-04-02 NOTE — LETTER
04/16/2017    Celia Lopez      To Whom It May Concern: This letter has been written at the patient's request. The above patient was seen at [facility] for treatment of a medical condition from 4/13/17-4/16/17.     The patient may return to wo
02-Apr-2022 17:48

## (undated) NOTE — MR AVS SNAPSHOT
[address]  [address]  [place] [address] [address]  [phone]               Thank you for choosing us for your health care visit with Christofer Craven PA-C.   We are glad to serve you and happy to provide you with this summar Today's Orders     Rapid Strep    Complete by:  As directed    Assoc Dx:   Sore throat [J02.9]           XR CHEST PA + LAT CHEST (CPT=71020)    Complete by:  Jan 24, 2017 (Approximate)    Assoc Dx:  Cough [R05]                 Scheduling Instructions     Tu including white bread, rice and pasta   Eat plenty of protein, keep the fat content low Sugars:  sodas and sports drinks, candies and desserts   Eat plenty of low-fat dairy products High fat meats and dairy   Choose whole grain products Foods high in sodiu

## (undated) NOTE — IP AVS SNAPSHOT
[facility] [place]  [address] [place], [address] ~ [phone]                Discharge Summary   4/13/2017    Reji Covarrubias           Admission Information        Provider Department    4/13/2017 Mary Ellen Pedro MD  5nw-A Kiara Cornelius taking these medications        Instructions Authorizing Provider    Morning Afternoon Evening As Needed    Albuterol Sulfate  (90 Base) MCG/ACT Aers   Commonly known as:  PROAIR HFA        Inh - guaiFENesin-codeine 100-10 MG/5ML Soln            Follow-up Information     Follow up with Mariana Pruett MD On 4/17/2017.     Specialties:  Family Medicine, Emergency Medicine    Why:  Follow up with Dr. Abelardo Fernando on 4/17 at 4:20    Contact informati Metabolic Lab Results  (Last result in the past 90 days)    HgbA1C Glucose BUN Creatinine Calcium Alkaline Phosph AST    -- (04/16/17)  104 (H) (04/16/17)  11 (04/16/17)  0.72 (04/16/17)  9.1 (04/04/17)  70 (04/04/17)  28      Metabolic Lab Results  (Last office, you can view your past visit information in [email or web address] by going to Visits < Visit Summaries. [email or web address] questions? Call [phone] for help. [email or web address] is NOT to be used for urgent needs.   For medical emergencies, dial 911.             _____________ Steroids     predniSONE 20 MG Oral Tab         Use: To treat inflammation, to prevent or treat allergic reactions, chemotherapy related nausea, used as part of some chemo protocols   Most common side effects:  Increased blood sugar, high blood pressure, flu

## (undated) NOTE — MR AVS SNAPSHOT
[address]  [address] [facility]. [place] [address] [address]  [phone]               Thank you for choosing us for your health care visit with Denise Toussaint MD.  We are glad to serve you and happy to provide you with this summary of doctor's office. Without the order your test may be delayed or postponed. If your doctor's office submitted the order electronically, it will be available when you check in.  If you or your doctor's office faxed the order, it will be available when you chec medicine such as from a hand held inhaler or aerosol breathing machine more than every 4 hours, contact your healthcare provider or seek immediate medical attention.  If prescribed an antibiotic or prednisone, take all of the medicine as prescribed, even if © 0744-0080 [address], [address]. All rights reserved. This information is not intended as a substitute for professional medical care. Always follow your healthcare professional's instructions.              Al MyChart     Visit [employer]  You can access your MyChart to more actively manage your health care and view more details from this visit by going to [email or web address].   If you've recently had a stay at the Hospital you can access your disc

## (undated) NOTE — Clinical Note
Dear Dr. Rodo Lawson,       Thank you for referring Shahla Howe to the [facility].   Sincerely,  TERESA Green

## (undated) NOTE — MR AVS SNAPSHOT
[address]  [address]  [address] [address]  [phone]               Thank you for choosing us for your health care visit with Bella Carvajal MD.  We are glad to serve you and happy to provide you with this summary of Your healthcare provider will examine you and ask about your symptoms and health history. You may also have a sputum culture to test the fluid in your lungs. Chest X-rays may be done to look for infection in the lungs.   Treating acute bronchitis  Bronchiti substitute for professional medical care. Always follow your healthcare professional's instructions.              Allergies as of Apr 04, 2017     No Known Allergies                Today's Vital Signs     BP Pulse Temp Height Weight BMI    118/82 mmHg 106 9

## (undated) NOTE — MR AVS SNAPSHOT
[address]  [address]  [address] [address]  [phone]               Thank you for choosing us for your health care visit with Sherrie Stapleton MD.  We are glad to serve you and happy to provide you with this summary of the airways. People with asthma get bronchospasm. However, not everyone with bronchospasm has asthma.   Being exposed to harmful fumes, a recent case of bronchitis, exercise, or a flare-up of chronic obstructive pulmonary disease (COPD) may cause the airway · You do not start to improve within 24 hours.   Call 911, or get immediate medical care  Contact emergency services if any of these occur:  · Coughing up bloody sputum (mucus)  · Chest pain with each breath  · Increased wheezing or shortness of breath  Parviz medications prescribed for you. Read the directions carefully, and ask your doctor or other care provider to review them with you.          Where to Get Your Medications      These medications were sent to [facility] [address] [place] [address], [address] [place] [address]

## (undated) NOTE — MR AVS SNAPSHOT
[address]  [address]  [address] [address] [address] [phone]               Thank you for choosing us for your health care visit with Stefany Caldwell MD.  We are glad to serve you and happy to provide you with this summary of call [facility] Scheduling at [phone].          Reason for Today's Visit     Edema           Medical Issues Discussed Today     Pedal edema    -  Primary    Screening for endocrine, nutritional, metabolic and immunity disorder        Micheal office, you can view your past visit information in [employer] [facility] [facility] by going to Visits < Visit Summaries. [facility] questions? Call [phone] for help. [employer] [facility] is NOT to be used for urgent needs. For medical emergencies, dial 911.            Visit [facility]

## (undated) NOTE — ED AVS SNAPSHOT
Mrs. Pavan Daniels   MRN: [id number]    Department:  [facility] Emergency Department in [place]   Date of Visit:  7/28/2019           Disclosure     Insurance plans vary and the physician(s) referred by the ER may not be covered by your plan.  Serina tell this physician (or your personal doctor if your instructions are to return to your personal doctor) about any new or lasting problems. The primary care or specialist physician will see patients referred from the [facility] Emergency Department.  Alejandro Matos

## (undated) NOTE — Clinical Note
[facility], [facility], [address] [facility]  Dept: [phone]  Dept Fax: [phone]         April 10, 2017    Patient: Lexie Perez   YOB: 1975   Date of Visit: 4/10/2017       To Whom It

## (undated) NOTE — LETTER
Date: 2/6/2018    Patient Name: Jason Arias          To Whom it may concern: This letter has been written at the patient's request. The above patient was seen at the [facility] for treatment of a medical condition.     This patient s

## (undated) NOTE — ED AVS SNAPSHOT
THE [facility] Emergency Department in [address] [facility]    Phone:  [phone]    Fax:  [address] [facility]   MRN: [id number]    Department:  THE [facility] Emergency Department in [place]   Date of [place] IF THERE IS ANY CHANGE OR WORSENING OF YOUR CONDITION, CALL YOUR PRIMARY CARE PHYSICIAN AT ONCE OR RETURN IMMEDIATELY TO THE EMERGENCY DEPARTMENT.     If you have been prescribed any medication(s), please fill your prescription right away and begin taking t

## (undated) NOTE — MR AVS SNAPSHOT
[facility], [facility]  [address] [facility], [facility] [address] [address] [phone]               Thank you for choosing us for your health care visit with HANK Savage.   We are glad to serve you and happy to provide you with this sum VITAMIN K1, SERUM VITB6, PYRIDOXAL 5-PHOSPHATE VL/BRANCHED-CHAIN FATTY ACIDS            Jun 26, 2017 12:30 PM   Procedure with HANK Marlow  ([facility])    [address] [place] [address] Dr [place] misoprostol 100 MCG Tabs   1 pill PO the night before procedure, 1 pill PO the morning of procedure   Commonly known as:  CYTOTEC           Omeprazole 40 MG Cpdr   TAKE 1 CAPSULE BY MOUTH EVERY DAY                Where to Get Your Medications      These m

## (undated) NOTE — ED AVS SNAPSHOT
THE [facility] Emergency Department in [address] [facility]    Phone:  [phone]    Fax:  [address] [facility]   MRN: [id number]    Department:  THE [facility] Emergency Department in [place]   Date of [place] IF THERE IS ANY CHANGE OR WORSENING OF YOUR CONDITION, CALL YOUR PRIMARY CARE PHYSICIAN AT ONCE OR RETURN IMMEDIATELY TO THE EMERGENCY DEPARTMENT.     If you have been prescribed any medication(s), please fill your prescription right away and begin taking t

## (undated) NOTE — ED AVS SNAPSHOT
Mrs. Segundo Rock   MRN: [id number]    Department:  [address] Emergency Department in [place]   Date of Visit:  3/2/2020           Disclosure     Insurance plans vary and the physician(s) referred by the ER may not be covered by your plan.  Please tell this physician (or your personal doctor if your instructions are to return to your personal doctor) about any new or lasting problems. The primary care or specialist physician will see patients referred from the [facility] Emergency Department.  Kody Angulo

## (undated) NOTE — LETTER
Donna Hernandez, :1975    CONSENT FOR PROCEDURE/SEDATION    1. I authorize the performance upon Donna Hernandez  the following: IUD Mirena    2.  I authorize Dr. Elsy Franco, APN (and whomever is designated as the doctor’s assistant), to Witness: _________________________________________ Date:___________     Physician Signature: _______________________________ Date:___________

## (undated) NOTE — MR AVS SNAPSHOT
[address]  [address]  [place] [address]  [phone]               Thank you for choosing us for your health care visit with Alireza Garcia MD.  We are glad to serve you and happy to provide you with this summary of pollen and animal dander can cause an asthma attack. Skipping doses of daily asthma medicine can also bring on an asthma attack.   Asthma can be controlled using the proper medicines prescribed by your healthcare provider and avoiding exposure to known trig are still in the yellow zone (50% to 80%) 15 minutes after using inhaler medicine.   Call 911  Call 911 if any of the following occur  · Trouble walking or talking because of shortness of breath  · If you use a peak flow meter as part of an Asthma Action Pl Commonly known as:  DELTASONE           * Notice: This list has 2 medication(s) that are the same as other medications prescribed for you. Read the directions carefully, and ask your doctor or other care provider to review them with you.             Matti

## (undated) NOTE — Clinical Note
[facility], [facility] [address], [address]  Dept: [phone]  Dept Fax: [phone]         April 17, 2017    Patient: Donna Hernandez   YOB: 1975   Date of Visit: 4/17/2017       To Whom It

## (undated) NOTE — MR AVS SNAPSHOT
[address]  [address]  [address] [place] [address] [address]  [phone]               Thank you for choosing us for your health care visit with Bess Longo NP.   We are glad to serve you and happy to provide you with this summary go back to your usual activities, don't let yourself get too tired. · Do not smoke. Also avoid being exposed to secondhand smoke. · You may use over-the-counter medicine to control fever or pain, unless another pain medicine was prescribed.  (Note: If you · Coughing up blood  · Worsening weakness, drowsiness, headache, or stiff neck  · Trouble breathing, wheezing, or pain with breathing  Date Last Reviewed: 9/13/2015  © 8697-3396 The [address] [place] [address], [address] [place] [address].  [facility] No Known Allergies                Today's Vital Signs     BP Pulse Temp Weight          100/60 mmHg 80 99.2 °F (37.3 °C) (Oral) 303 lb           Current Medications          This list is accurate as of: 3/9/17  6:20 PM.  Always use your most recent med li

## (undated) NOTE — ED AVS SNAPSHOT
[facility] Emergency Department in [address] [facility]    Phone:  [phone]    Fax:  [address] [facility]   MRN: [id number]    Department:  [facility] Emergency Department in [place]   Date of [facility] take both doses of the new and old medication. ONLY take the dose prescribed today.             Where to Get Your Medications      You can get these medications from any pharmacy     Bring a paper prescription for each of these medications    - albuterol s primary care or specialist physician will see patients referred from the [facility] Emergency Department. Follow-up care is at the discretion of that Physician.     IF THERE IS ANY CHANGE OR WORSENING OF YOUR CONDITION, CALL YOUR PRIMARY CARE PHYSICIAN harming yourself, contact [facility] at [phone]. - If you don’t have insurance, Rubens Chacon has partnered with Patient [facility] [facility] to help you get signed up for insurance coverage.   Patient [employer] You can access your MyChart to more actively manage your health care and view more details from this visit by going to [email or web address].   If you've recently had a stay at the Hospital you can access your discharge instructions in [address] by [employer]

## (undated) NOTE — MR AVS SNAPSHOT
After Visit Summary   6/1/2017    Clementina Avelar    MRN: [id number]           Visit Information        Provider Department Dept Phone    6/1/2017  9:00 PM Bed1  [facility] [phone]      Your Cristhian Abernathy Were     LMP                   05/02/20